# Patient Record
Sex: MALE | Race: WHITE | Employment: FULL TIME | URBAN - METROPOLITAN AREA
[De-identification: names, ages, dates, MRNs, and addresses within clinical notes are randomized per-mention and may not be internally consistent; named-entity substitution may affect disease eponyms.]

---

## 2020-07-21 ENCOUNTER — APPOINTMENT (OUTPATIENT)
Dept: CT IMAGING | Age: 50
End: 2020-07-21
Payer: COMMERCIAL

## 2020-07-21 ENCOUNTER — HOSPITAL ENCOUNTER (EMERGENCY)
Age: 50
Discharge: HOME | End: 2020-07-21
Attending: EMERGENCY MEDICINE
Payer: COMMERCIAL

## 2020-07-21 VITALS
WEIGHT: 210 LBS | HEART RATE: 74 BPM | SYSTOLIC BLOOD PRESSURE: 114 MMHG | RESPIRATION RATE: 16 BRPM | HEIGHT: 70 IN | DIASTOLIC BLOOD PRESSURE: 76 MMHG | TEMPERATURE: 98 F | BODY MASS INDEX: 30.06 KG/M2 | OXYGEN SATURATION: 100 %

## 2020-07-21 DIAGNOSIS — N20.0 KIDNEY STONE: Primary | ICD-10-CM

## 2020-07-21 LAB
ALBUMIN SERPL-MCNC: 4 G/DL (ref 3.4–5)
ALBUMIN/GLOB SERPL: 1.1 {RATIO} (ref 1–2)
ALP LIVER SERPL-CCNC: 80 U/L (ref 45–117)
ALT SERPL-CCNC: 21 U/L (ref 16–61)
ANION GAP SERPL CALC-SCNC: 4 MMOL/L (ref 0–18)
AST SERPL-CCNC: 17 U/L (ref 15–37)
BASOPHILS # BLD AUTO: 0.05 X10(3) UL (ref 0–0.2)
BASOPHILS NFR BLD AUTO: 0.4 %
BILIRUB SERPL-MCNC: 0.3 MG/DL (ref 0.1–2)
BILIRUB UR QL STRIP.AUTO: NEGATIVE
BUN BLD-MCNC: 12 MG/DL (ref 7–18)
BUN/CREAT SERPL: 11.8 (ref 10–20)
CALCIUM BLD-MCNC: 9.3 MG/DL (ref 8.5–10.1)
CHLORIDE SERPL-SCNC: 106 MMOL/L (ref 98–112)
CLARITY UR REFRACT.AUTO: CLEAR
CO2 SERPL-SCNC: 27 MMOL/L (ref 21–32)
COLOR UR AUTO: YELLOW
CREAT BLD-MCNC: 1.02 MG/DL (ref 0.7–1.3)
DEPRECATED RDW RBC AUTO: 39.7 FL (ref 35.1–46.3)
EOSINOPHIL # BLD AUTO: 0.33 X10(3) UL (ref 0–0.7)
EOSINOPHIL NFR BLD AUTO: 2.4 %
ERYTHROCYTE [DISTWIDTH] IN BLOOD BY AUTOMATED COUNT: 12.4 % (ref 11–15)
GLOBULIN PLAS-MCNC: 3.8 G/DL (ref 2.8–4.4)
GLUCOSE BLD-MCNC: 122 MG/DL (ref 70–99)
GLUCOSE UR STRIP.AUTO-MCNC: NEGATIVE MG/DL
HCT VFR BLD AUTO: 41.4 % (ref 39–53)
HGB BLD-MCNC: 13.8 G/DL (ref 13–17.5)
IMM GRANULOCYTES # BLD AUTO: 0.06 X10(3) UL (ref 0–1)
IMM GRANULOCYTES NFR BLD: 0.4 %
KETONES UR STRIP.AUTO-MCNC: NEGATIVE MG/DL
LEUKOCYTE ESTERASE UR QL STRIP.AUTO: NEGATIVE
LIPASE SERPL-CCNC: 102 U/L (ref 73–393)
LYMPHOCYTES # BLD AUTO: 4.54 X10(3) UL (ref 1–4)
LYMPHOCYTES NFR BLD AUTO: 33.3 %
M PROTEIN MFR SERPL ELPH: 7.8 G/DL (ref 6.4–8.2)
MCH RBC QN AUTO: 29.2 PG (ref 26–34)
MCHC RBC AUTO-ENTMCNC: 33.3 G/DL (ref 31–37)
MCV RBC AUTO: 87.7 FL (ref 80–100)
MONOCYTES # BLD AUTO: 1.43 X10(3) UL (ref 0.1–1)
MONOCYTES NFR BLD AUTO: 10.5 %
NEUTROPHILS # BLD AUTO: 7.23 X10 (3) UL (ref 1.5–7.7)
NEUTROPHILS # BLD AUTO: 7.23 X10(3) UL (ref 1.5–7.7)
NEUTROPHILS NFR BLD AUTO: 53 %
NITRITE UR QL STRIP.AUTO: NEGATIVE
OSMOLALITY SERPL CALC.SUM OF ELEC: 285 MOSM/KG (ref 275–295)
PH UR STRIP.AUTO: 5.5 [PH] (ref 4.5–8)
PLATELET # BLD AUTO: 377 10(3)UL (ref 150–450)
POTASSIUM SERPL-SCNC: 3.9 MMOL/L (ref 3.5–5.1)
PROT UR STRIP.AUTO-MCNC: NEGATIVE MG/DL
RBC # BLD AUTO: 4.72 X10(6)UL (ref 4.3–5.7)
RBC #/AREA URNS AUTO: >10 /HPF
SODIUM SERPL-SCNC: 137 MMOL/L (ref 136–145)
SP GR UR STRIP.AUTO: >=1.03 (ref 1–1.03)
UROBILINOGEN UR STRIP.AUTO-MCNC: 0.2 MG/DL
WBC # BLD AUTO: 13.6 X10(3) UL (ref 4–11)

## 2020-07-21 PROCEDURE — 74176 CT ABD & PELVIS W/O CONTRAST: CPT | Performed by: EMERGENCY MEDICINE

## 2020-07-21 PROCEDURE — 83690 ASSAY OF LIPASE: CPT | Performed by: EMERGENCY MEDICINE

## 2020-07-21 PROCEDURE — 99284 EMERGENCY DEPT VISIT MOD MDM: CPT

## 2020-07-21 PROCEDURE — 85025 COMPLETE CBC W/AUTO DIFF WBC: CPT | Performed by: EMERGENCY MEDICINE

## 2020-07-21 PROCEDURE — 96375 TX/PRO/DX INJ NEW DRUG ADDON: CPT

## 2020-07-21 PROCEDURE — 81001 URINALYSIS AUTO W/SCOPE: CPT | Performed by: EMERGENCY MEDICINE

## 2020-07-21 PROCEDURE — 80053 COMPREHEN METABOLIC PANEL: CPT | Performed by: EMERGENCY MEDICINE

## 2020-07-21 PROCEDURE — 83690 ASSAY OF LIPASE: CPT

## 2020-07-21 PROCEDURE — 85025 COMPLETE CBC W/AUTO DIFF WBC: CPT

## 2020-07-21 PROCEDURE — 80053 COMPREHEN METABOLIC PANEL: CPT

## 2020-07-21 PROCEDURE — 96374 THER/PROPH/DIAG INJ IV PUSH: CPT

## 2020-07-21 PROCEDURE — 96361 HYDRATE IV INFUSION ADD-ON: CPT

## 2020-07-21 RX ORDER — ONDANSETRON 4 MG/1
4 TABLET, FILM COATED ORAL EVERY 8 HOURS PRN
Qty: 10 TABLET | Refills: 0 | Status: SHIPPED | OUTPATIENT
Start: 2020-07-21 | End: 2021-07-16 | Stop reason: ALTCHOICE

## 2020-07-21 RX ORDER — HYDROMORPHONE HYDROCHLORIDE 1 MG/ML
0.5 INJECTION, SOLUTION INTRAMUSCULAR; INTRAVENOUS; SUBCUTANEOUS ONCE
Status: DISCONTINUED | OUTPATIENT
Start: 2020-07-21 | End: 2020-07-21

## 2020-07-21 RX ORDER — LISINOPRIL 10 MG/1
20 TABLET ORAL DAILY
COMMUNITY
End: 2021-12-01

## 2020-07-21 RX ORDER — HYDROCODONE BITARTRATE AND ACETAMINOPHEN 5; 325 MG/1; MG/1
1 TABLET ORAL EVERY 6 HOURS PRN
Qty: 10 TABLET | Refills: 0 | Status: SHIPPED | OUTPATIENT
Start: 2020-07-21 | End: 2020-07-31

## 2020-07-21 RX ORDER — KETOROLAC TROMETHAMINE 30 MG/ML
30 INJECTION, SOLUTION INTRAMUSCULAR; INTRAVENOUS ONCE
Status: COMPLETED | OUTPATIENT
Start: 2020-07-21 | End: 2020-07-21

## 2020-07-21 RX ORDER — HYDROMORPHONE HYDROCHLORIDE 1 MG/ML
1 INJECTION, SOLUTION INTRAMUSCULAR; INTRAVENOUS; SUBCUTANEOUS ONCE
Status: COMPLETED | OUTPATIENT
Start: 2020-07-21 | End: 2020-07-21

## 2020-07-21 RX ORDER — SODIUM CHLORIDE 9 MG/ML
INJECTION, SOLUTION INTRAVENOUS CONTINUOUS
Status: DISCONTINUED | OUTPATIENT
Start: 2020-07-21 | End: 2020-07-22

## 2020-07-21 RX ORDER — ONDANSETRON 2 MG/ML
4 INJECTION INTRAMUSCULAR; INTRAVENOUS ONCE
Status: COMPLETED | OUTPATIENT
Start: 2020-07-21 | End: 2020-07-21

## 2020-07-22 NOTE — ED PROVIDER NOTES
Patient Seen in: [facility] In [place]      History   Patient presents with:  Abdomen/Flank Pain    Stated Complaint: flank pain    HPI    This is a 41-year-old male with past medical history of hypertension who presents with acute on no rubs or gallops. ABDOMEN: Soft, nontender and nondistended. Normoactive bowel sounds. No rebound. No guarding. EXTREMITIES: Warm with brisk capillary refill.          ED Course     Labs Reviewed   COMP METABOLIC PANEL (14) - Abnormal; Notable for the which includes the Dose Index Registry. PATIENT STATED HISTORY: (As transcribed by Technologist)  Sudden onset of RLQ abdomen pain with nausea    FINDINGS:  KIDNEYS:  Normal anatomic positions.   0.3 cm proximal right ureteral calculus with mild hydronephr pass stone on his own. He should return if worse, increased pain, fever or vomiting. Otherwise follow kidney stone discharge instructions and follow-up with urology. He is comfortable this plan and discharged home in good condition.           Dispositi

## 2020-08-06 ENCOUNTER — HOSPITAL ENCOUNTER (EMERGENCY)
Age: 50
Discharge: HOME | End: 2020-08-06
Attending: EMERGENCY MEDICINE
Payer: COMMERCIAL

## 2020-08-06 ENCOUNTER — APPOINTMENT (OUTPATIENT)
Dept: CT IMAGING | Age: 50
End: 2020-08-06
Attending: EMERGENCY MEDICINE
Payer: COMMERCIAL

## 2020-08-06 VITALS
HEIGHT: 70 IN | OXYGEN SATURATION: 99 % | SYSTOLIC BLOOD PRESSURE: 129 MMHG | WEIGHT: 210 LBS | HEART RATE: 90 BPM | DIASTOLIC BLOOD PRESSURE: 76 MMHG | TEMPERATURE: 98 F | BODY MASS INDEX: 30.06 KG/M2 | RESPIRATION RATE: 20 BRPM

## 2020-08-06 DIAGNOSIS — N20.0 KIDNEY STONE: Primary | ICD-10-CM

## 2020-08-06 LAB
ALBUMIN SERPL-MCNC: 4.2 G/DL (ref 3.4–5)
ALBUMIN/GLOB SERPL: 1.1 {RATIO} (ref 1–2)
ALP LIVER SERPL-CCNC: 80 U/L (ref 45–117)
ALT SERPL-CCNC: 27 U/L (ref 16–61)
ANION GAP SERPL CALC-SCNC: 6 MMOL/L (ref 0–18)
AST SERPL-CCNC: 20 U/L (ref 15–37)
BASOPHILS # BLD AUTO: 0.03 X10(3) UL (ref 0–0.2)
BASOPHILS NFR BLD AUTO: 0.2 %
BILIRUB SERPL-MCNC: 0.7 MG/DL (ref 0.1–2)
BILIRUB UR QL STRIP.AUTO: NEGATIVE
BUN BLD-MCNC: 13 MG/DL (ref 7–18)
BUN/CREAT SERPL: 11.7 (ref 10–20)
CALCIUM BLD-MCNC: 9.1 MG/DL (ref 8.5–10.1)
CHLORIDE SERPL-SCNC: 102 MMOL/L (ref 98–112)
CLARITY UR REFRACT.AUTO: CLEAR
CO2 SERPL-SCNC: 26 MMOL/L (ref 21–32)
COLOR UR AUTO: YELLOW
CREAT BLD-MCNC: 1.11 MG/DL (ref 0.7–1.3)
DEPRECATED RDW RBC AUTO: 38.8 FL (ref 35.1–46.3)
EOSINOPHIL # BLD AUTO: 0.01 X10(3) UL (ref 0–0.7)
EOSINOPHIL NFR BLD AUTO: 0.1 %
ERYTHROCYTE [DISTWIDTH] IN BLOOD BY AUTOMATED COUNT: 12.2 % (ref 11–15)
GLOBULIN PLAS-MCNC: 3.7 G/DL (ref 2.8–4.4)
GLUCOSE BLD-MCNC: 122 MG/DL (ref 70–99)
GLUCOSE UR STRIP.AUTO-MCNC: NEGATIVE MG/DL
HCT VFR BLD AUTO: 45 % (ref 39–53)
HGB BLD-MCNC: 15.1 G/DL (ref 13–17.5)
IMM GRANULOCYTES # BLD AUTO: 0.08 X10(3) UL (ref 0–1)
IMM GRANULOCYTES NFR BLD: 0.4 %
KETONES UR STRIP.AUTO-MCNC: NEGATIVE MG/DL
LEUKOCYTE ESTERASE UR QL STRIP.AUTO: NEGATIVE
LYMPHOCYTES # BLD AUTO: 1.61 X10(3) UL (ref 1–4)
LYMPHOCYTES NFR BLD AUTO: 8.2 %
M PROTEIN MFR SERPL ELPH: 7.9 G/DL (ref 6.4–8.2)
MCH RBC QN AUTO: 29.2 PG (ref 26–34)
MCHC RBC AUTO-ENTMCNC: 33.6 G/DL (ref 31–37)
MCV RBC AUTO: 86.9 FL (ref 80–100)
MONOCYTES # BLD AUTO: 1.55 X10(3) UL (ref 0.1–1)
MONOCYTES NFR BLD AUTO: 7.9 %
NEUTROPHILS # BLD AUTO: 16.42 X10 (3) UL (ref 1.5–7.7)
NEUTROPHILS # BLD AUTO: 16.42 X10(3) UL (ref 1.5–7.7)
NEUTROPHILS NFR BLD AUTO: 83.2 %
NITRITE UR QL STRIP.AUTO: NEGATIVE
OSMOLALITY SERPL CALC.SUM OF ELEC: 279 MOSM/KG (ref 275–295)
PH UR STRIP.AUTO: 6.5 [PH] (ref 4.5–8)
PLATELET # BLD AUTO: 407 10(3)UL (ref 150–450)
POTASSIUM SERPL-SCNC: 3.6 MMOL/L (ref 3.5–5.1)
PROT UR STRIP.AUTO-MCNC: NEGATIVE MG/DL
RBC # BLD AUTO: 5.18 X10(6)UL (ref 4.3–5.7)
SODIUM SERPL-SCNC: 134 MMOL/L (ref 136–145)
SP GR UR STRIP.AUTO: <=1.005 (ref 1–1.03)
UROBILINOGEN UR STRIP.AUTO-MCNC: 0.2 MG/DL
WBC # BLD AUTO: 19.7 X10(3) UL (ref 4–11)

## 2020-08-06 PROCEDURE — 99284 EMERGENCY DEPT VISIT MOD MDM: CPT

## 2020-08-06 PROCEDURE — 81001 URINALYSIS AUTO W/SCOPE: CPT | Performed by: EMERGENCY MEDICINE

## 2020-08-06 PROCEDURE — 85025 COMPLETE CBC W/AUTO DIFF WBC: CPT | Performed by: EMERGENCY MEDICINE

## 2020-08-06 PROCEDURE — 96374 THER/PROPH/DIAG INJ IV PUSH: CPT

## 2020-08-06 PROCEDURE — 96361 HYDRATE IV INFUSION ADD-ON: CPT

## 2020-08-06 PROCEDURE — 80053 COMPREHEN METABOLIC PANEL: CPT | Performed by: EMERGENCY MEDICINE

## 2020-08-06 PROCEDURE — 74176 CT ABD & PELVIS W/O CONTRAST: CPT | Performed by: EMERGENCY MEDICINE

## 2020-08-06 RX ORDER — ONDANSETRON 4 MG/1
4 TABLET, ORALLY DISINTEGRATING ORAL EVERY 4 HOURS PRN
Qty: 10 TABLET | Refills: 0 | Status: SHIPPED | OUTPATIENT
Start: 2020-08-06 | End: 2020-08-13

## 2020-08-06 RX ORDER — HYDROCODONE BITARTRATE AND ACETAMINOPHEN 5; 325 MG/1; MG/1
1-2 TABLET ORAL EVERY 6 HOURS PRN
Qty: 10 TABLET | Refills: 0 | Status: SHIPPED | OUTPATIENT
Start: 2020-08-06 | End: 2020-08-13

## 2020-08-06 RX ORDER — KETOROLAC TROMETHAMINE 30 MG/ML
15 INJECTION, SOLUTION INTRAMUSCULAR; INTRAVENOUS ONCE
Status: COMPLETED | OUTPATIENT
Start: 2020-08-06 | End: 2020-08-06

## 2020-08-06 NOTE — ED PROVIDER NOTES
Patient Seen in: [facility] Emergency Department In [place]      History   Patient presents with:  Abdomen/Flank Pain    Stated Complaint: r flank pain    HPI    26-year-old male presents with right flank pain.   He was seen in this department approximatel Extremities: no edema, normal peripheral pulses   Neuro: Alert oriented and nonfocal   Skin: no rashes or nodules    ED Course     Labs Reviewed   URINALYSIS WITH CULTURE REFLEX - Abnormal; Notable for the following components:       Result Value    Bloo ([facility]) NRDR ([address]) which includes the Dose Index Registry. PATIENT STATED HISTORY: (As transcribed by Technologist)  Patient c/o right low back that radiates into RLQ since 1am. Hx of stones.     FINDING Oral)(cpt=74176)    Result Date: 7/21/2020  PROCEDURE:  CT ABDOMEN+PELVIS KIDNEYSTONE 2D RNDR(NO IV,NO ORAL)(CPT=74176)  COMPARISON:  None.   INDICATIONS:  flank pain  TECHNIQUE:  Unenhanced multislice CT scanning from above the kidneys to below the urinary PM     Finalized by: Kendra Hawkins MD on 7/21/2020 at 10:50 PM                MDM     CT scan again demonstrates a 0.3 mm right ureteral stone. The stone does appear to have moved more distally when compared to the previous.   Urinalysis shows no evidence of

## 2020-08-21 RX ORDER — HYDROCODONE BITARTRATE AND ACETAMINOPHEN 5; 325 MG/1; MG/1
1 TABLET ORAL EVERY 6 HOURS PRN
COMMUNITY
End: 2021-05-12

## 2020-08-24 ENCOUNTER — APPOINTMENT (OUTPATIENT)
Dept: LAB | Age: 50
End: 2020-08-24
Payer: COMMERCIAL

## 2020-08-24 DIAGNOSIS — N20.1 URETERAL STONE: ICD-10-CM

## 2020-08-24 LAB
ATRIAL RATE: 81 BPM
P AXIS: 42 DEGREES
P-R INTERVAL: 146 MS
Q-T INTERVAL: 348 MS
QRS DURATION: 86 MS
QTC CALCULATION (BEZET): 404 MS
R AXIS: 49 DEGREES
T AXIS: 15 DEGREES
VENTRICULAR RATE: 81 BPM

## 2020-08-24 PROCEDURE — 93010 ELECTROCARDIOGRAM REPORT: CPT | Performed by: INTERNAL MEDICINE

## 2020-08-24 PROCEDURE — 93005 ELECTROCARDIOGRAM TRACING: CPT

## 2020-08-25 ENCOUNTER — APPOINTMENT (OUTPATIENT)
Dept: GENERAL RADIOLOGY | Facility: HOSPITAL | Age: 50
End: 2020-08-25
Attending: UROLOGY
Payer: COMMERCIAL

## 2020-08-25 ENCOUNTER — ANESTHESIA (OUTPATIENT)
Dept: SURGERY | Facility: HOSPITAL | Age: 50
End: 2020-08-25
Payer: COMMERCIAL

## 2020-08-25 ENCOUNTER — HOSPITAL ENCOUNTER (OUTPATIENT)
Facility: HOSPITAL | Age: 50
Setting detail: HOSPITAL OUTPATIENT SURGERY
Discharge: HOME | End: 2020-08-25
Attending: UROLOGY | Admitting: UROLOGY
Payer: COMMERCIAL

## 2020-08-25 ENCOUNTER — ANESTHESIA EVENT (OUTPATIENT)
Dept: SURGERY | Facility: HOSPITAL | Age: 50
End: 2020-08-25
Payer: COMMERCIAL

## 2020-08-25 VITALS
BODY MASS INDEX: 29.86 KG/M2 | RESPIRATION RATE: 18 BRPM | OXYGEN SATURATION: 99 % | HEART RATE: 68 BPM | SYSTOLIC BLOOD PRESSURE: 126 MMHG | DIASTOLIC BLOOD PRESSURE: 91 MMHG | TEMPERATURE: 97 F | WEIGHT: 208.56 LBS | HEIGHT: 70 IN

## 2020-08-25 DIAGNOSIS — N20.1 URETERAL STONE: Primary | ICD-10-CM

## 2020-08-25 LAB — SARS-COV-2 RNA RESP QL NAA+PROBE: NOT DETECTED

## 2020-08-25 PROCEDURE — 0TC68ZZ EXTIRPATION OF MATTER FROM RIGHT URETER, VIA NATURAL OR ARTIFICIAL OPENING ENDOSCOPIC: ICD-10-PCS | Performed by: UROLOGY

## 2020-08-25 PROCEDURE — 82365 CALCULUS SPECTROSCOPY: CPT | Performed by: UROLOGY

## 2020-08-25 PROCEDURE — 0T768DZ DILATION OF RIGHT URETER WITH INTRALUMINAL DEVICE, VIA NATURAL OR ARTIFICIAL OPENING ENDOSCOPIC: ICD-10-PCS | Performed by: UROLOGY

## 2020-08-25 DEVICE — URETERAL STENT
Type: IMPLANTABLE DEVICE | Site: URETER | Status: FUNCTIONAL
Brand: ASCERTA™

## 2020-08-25 RX ORDER — DEXAMETHASONE SODIUM PHOSPHATE 4 MG/ML
VIAL (ML) INJECTION AS NEEDED
Status: DISCONTINUED | OUTPATIENT
Start: 2020-08-25 | End: 2020-08-25 | Stop reason: SURG

## 2020-08-25 RX ORDER — NALOXONE HYDROCHLORIDE 0.4 MG/ML
80 INJECTION, SOLUTION INTRAMUSCULAR; INTRAVENOUS; SUBCUTANEOUS AS NEEDED
Status: DISCONTINUED | OUTPATIENT
Start: 2020-08-25 | End: 2020-08-25

## 2020-08-25 RX ORDER — SODIUM CHLORIDE, SODIUM LACTATE, POTASSIUM CHLORIDE, CALCIUM CHLORIDE 600; 310; 30; 20 MG/100ML; MG/100ML; MG/100ML; MG/100ML
INJECTION, SOLUTION INTRAVENOUS CONTINUOUS
Status: DISCONTINUED | OUTPATIENT
Start: 2020-08-25 | End: 2020-08-25

## 2020-08-25 RX ORDER — PHENAZOPYRIDINE HYDROCHLORIDE 200 MG/1
200 TABLET, FILM COATED ORAL ONCE
Status: COMPLETED | OUTPATIENT
Start: 2020-08-25 | End: 2020-08-25

## 2020-08-25 RX ORDER — IBUPROFEN 600 MG/1
600 TABLET ORAL ONCE AS NEEDED
Status: DISCONTINUED | OUTPATIENT
Start: 2020-08-25 | End: 2020-08-25

## 2020-08-25 RX ORDER — ACETAMINOPHEN 500 MG
1000 TABLET ORAL ONCE
Status: DISCONTINUED | OUTPATIENT
Start: 2020-08-25 | End: 2020-08-25 | Stop reason: HOSPADM

## 2020-08-25 RX ORDER — ONDANSETRON 2 MG/ML
4 INJECTION INTRAMUSCULAR; INTRAVENOUS AS NEEDED
Status: DISCONTINUED | OUTPATIENT
Start: 2020-08-25 | End: 2020-08-25

## 2020-08-25 RX ORDER — ONDANSETRON 2 MG/ML
INJECTION INTRAMUSCULAR; INTRAVENOUS AS NEEDED
Status: DISCONTINUED | OUTPATIENT
Start: 2020-08-25 | End: 2020-08-25 | Stop reason: SURG

## 2020-08-25 RX ORDER — HYDROCODONE BITARTRATE AND ACETAMINOPHEN 5; 325 MG/1; MG/1
2 TABLET ORAL AS NEEDED
Status: COMPLETED | OUTPATIENT
Start: 2020-08-25 | End: 2020-08-25

## 2020-08-25 RX ORDER — HYDROMORPHONE HYDROCHLORIDE 1 MG/ML
0.4 INJECTION, SOLUTION INTRAMUSCULAR; INTRAVENOUS; SUBCUTANEOUS EVERY 5 MIN PRN
Status: DISCONTINUED | OUTPATIENT
Start: 2020-08-25 | End: 2020-08-25

## 2020-08-25 RX ORDER — METOCLOPRAMIDE HYDROCHLORIDE 5 MG/ML
10 INJECTION INTRAMUSCULAR; INTRAVENOUS AS NEEDED
Status: DISCONTINUED | OUTPATIENT
Start: 2020-08-25 | End: 2020-08-25

## 2020-08-25 RX ORDER — ACETAMINOPHEN 500 MG
1000 TABLET ORAL EVERY 6 HOURS PRN
COMMUNITY

## 2020-08-25 RX ORDER — KETOROLAC TROMETHAMINE 30 MG/ML
INJECTION, SOLUTION INTRAMUSCULAR; INTRAVENOUS AS NEEDED
Status: DISCONTINUED | OUTPATIENT
Start: 2020-08-25 | End: 2020-08-25 | Stop reason: SURG

## 2020-08-25 RX ORDER — OXYBUTYNIN CHLORIDE 5 MG/1
5 TABLET ORAL ONCE
Status: COMPLETED | OUTPATIENT
Start: 2020-08-25 | End: 2020-08-25

## 2020-08-25 RX ORDER — CEFAZOLIN SODIUM/WATER 2 G/20 ML
2 SYRINGE (ML) INTRAVENOUS ONCE
Status: COMPLETED | OUTPATIENT
Start: 2020-08-25 | End: 2020-08-25

## 2020-08-25 RX ORDER — DEXAMETHASONE SODIUM PHOSPHATE 4 MG/ML
4 VIAL (ML) INJECTION AS NEEDED
Status: DISCONTINUED | OUTPATIENT
Start: 2020-08-25 | End: 2020-08-25

## 2020-08-25 RX ORDER — HYDROCODONE BITARTRATE AND ACETAMINOPHEN 5; 325 MG/1; MG/1
1 TABLET ORAL EVERY 4 HOURS PRN
Qty: 10 TABLET | Refills: 0 | Status: SHIPPED | OUTPATIENT
Start: 2020-08-25 | End: 2020-09-04

## 2020-08-25 RX ORDER — HYDROCODONE BITARTRATE AND ACETAMINOPHEN 5; 325 MG/1; MG/1
1 TABLET ORAL AS NEEDED
Status: COMPLETED | OUTPATIENT
Start: 2020-08-25 | End: 2020-08-25

## 2020-08-25 RX ORDER — MIDAZOLAM HYDROCHLORIDE 1 MG/ML
INJECTION INTRAMUSCULAR; INTRAVENOUS AS NEEDED
Status: DISCONTINUED | OUTPATIENT
Start: 2020-08-25 | End: 2020-08-25 | Stop reason: SURG

## 2020-08-25 RX ORDER — TAMSULOSIN HYDROCHLORIDE 0.4 MG/1
0.4 CAPSULE ORAL DAILY
Qty: 7 CAPSULE | Refills: 0 | Status: SHIPPED | OUTPATIENT
Start: 2020-08-25 | End: 2020-09-01

## 2020-08-25 RX ADMIN — KETOROLAC TROMETHAMINE 30 MG: 30 INJECTION, SOLUTION INTRAMUSCULAR; INTRAVENOUS at 08:47:00

## 2020-08-25 RX ADMIN — DEXAMETHASONE SODIUM PHOSPHATE 4 MG: 4 MG/ML VIAL (ML) INJECTION at 08:38:00

## 2020-08-25 RX ADMIN — MIDAZOLAM HYDROCHLORIDE 2 MG: 1 INJECTION INTRAMUSCULAR; INTRAVENOUS at 08:36:00

## 2020-08-25 RX ADMIN — CEFAZOLIN SODIUM/WATER 2 G: 2 G/20 ML SYRINGE (ML) INTRAVENOUS at 08:39:00

## 2020-08-25 RX ADMIN — SODIUM CHLORIDE, SODIUM LACTATE, POTASSIUM CHLORIDE, CALCIUM CHLORIDE: 600; 310; 30; 20 INJECTION, SOLUTION INTRAVENOUS at 09:13:00

## 2020-08-25 RX ADMIN — ONDANSETRON 4 MG: 2 INJECTION INTRAMUSCULAR; INTRAVENOUS at 08:38:00

## 2020-08-25 NOTE — OPERATIVE REPORT
URETEROSCOPY OPERATIVE NOTE    PATIENT NAME: Katie Show OF BIRTH: 11/11/1970  DATE OF SERVICE: 8/25/2020    SURGEON:  Domenico Flower MD    ASSISTANT:  None    PREOPERATIVE DIAGNOSIS:  Right mid ureteral calculus, no evidence of stone passage The patient was brought to the operating room and placed in the supine position on the OR table. SCD's were applied and all pressure points were carefully padded. At this point, anesthesia was successfully induced.  The patient was then given the perioper This completed the procedure. They tolerated it well without complications. Please note that I was present for, and completed the entirety of this operation myself. PLAN:  He can remove the stent on the string on Friday AM, 8/28.       HUGH Lynn

## 2020-08-25 NOTE — ANESTHESIA POSTPROCEDURE EVALUATION
[address] Patient Status:  Hospital Outpatient Surgery   Age/Gender 52year old male MRN [id number]   Location [address] Attending Vivi Oppenheim, MD   Hosp Day # 0 [facility] 72.

## 2020-08-25 NOTE — ANESTHESIA PROCEDURE NOTES
Airway  Urgency: elective      General Information and Staff    Patient location during procedure: OR  Anesthesiologist: Columba Chung MD  Performed: anesthesiologist     Indications and Patient Condition  Indications for airway management: anesthesia  Sed

## 2020-08-25 NOTE — ANESTHESIA PREPROCEDURE EVALUATION
PRE-OP EVALUATION    Patient Name: Malathi Rivero    Pre-op Diagnosis: Ureteral stone [N20.1]    Procedure(s):  CYSTOSCOPY RIGHT URETEROSCOPY HOLMIUM LASER LITHOTRIPSY, RIGHT RETROGRADE PYELOGRAM RIGHT URETERAL STENT PLACEMENT     Surgeon(s) and Role: K 3.6 08/06/2020     08/06/2020    CO2 26.0 08/06/2020    BUN 13 08/06/2020    CREATSERUM 1.11 08/06/2020     (H) 08/06/2020    CA 9.1 08/06/2020            Airway      Mallampati: I       Cardiovascular    Cardiovascular exam normal.

## 2020-08-25 NOTE — H&P
Urology Pre Op H&P:   Encounter Date: 8/25/2020    Chief Complaint:   Nephrolithiasis    History of Present Illness:   Matilde Lemus is a 52year old male who presents today for evaluation of nephrolithiasis.     Patient presents for kidney stone cons Skin: Denies skin changes or rash. Cardiac: Denies chest pain, palpitations, or orthopnea. Pulmonary: Denies cough, hemoptysis, shortness of breath, or dyspnea on exertion. GI: Denies abdominal pain, nausea, vomiting, or changes in bowel movements.   [employer] 136 - 145 mmol/L 134 (L)   Potassium      3.5 - 5.1 mmol/L 3.6   Chloride      98 - 112 mmol/L 102   Carbon Dioxide, Total      21.0 - 32.0 mmol/L 26.0   ANION GAP      0 - 18 mmol/L 6   BUN      7 - 18 mg/dL 13   CREATININE      0.70 - 1.30 mg/dL 1.11 We first discussed medical expulsive therapy (MET).   This is a non-surgical option for patients with calculi <10mm who do not have indications for urgent intervention such as uncontrolled pain, acute kidney injury (BRITT), an obstructed solitary kidney, or s We then discussed ureteroscopic stone removal.  This is a surgical procedure in which a small telescope is passed through the urethra, into the bladder, and into the ureter/kidney.   When the stone is identified, it is either grasped and removed as a single I have discussed the risks, benefits and alternatives to the proposed procedure/treatment plan with the patient. Our discussion also included the risks and benefits of the alternatives treatment options, including doing nothing.  They were encouraged to as

## 2020-08-29 LAB
CALCULI MASS: 14 MG
CALCULI NUMBER: 2

## 2020-08-31 NOTE — PROGRESS NOTES
Results released to patient via JANZZ. Mr. Quang Grimes -     Your kidney stone is the most common type, calcium oxalate. See below for stone prevention recommendations. Recommendations to Help Prevent Kidney Stones     1.  Drink enough water to pro diabetes, careful control of your blood glucose (sugar) levels can be helpful in preventing certain types of stones. Unfortunately, even with these dietary changes, some patients will continue to form stones.   For patients who form multiple stones over

## 2021-02-24 ENCOUNTER — APPOINTMENT (OUTPATIENT)
Dept: GENERAL RADIOLOGY | Age: 51
End: 2021-02-24
Attending: PHYSICIAN ASSISTANT
Payer: OTHER MISCELLANEOUS

## 2021-02-24 ENCOUNTER — HOSPITAL ENCOUNTER (EMERGENCY)
Age: 51
Discharge: HOME | End: 2021-02-24
Payer: OTHER MISCELLANEOUS

## 2021-02-24 VITALS
WEIGHT: 210 LBS | BODY MASS INDEX: 30.06 KG/M2 | SYSTOLIC BLOOD PRESSURE: 131 MMHG | RESPIRATION RATE: 16 BRPM | OXYGEN SATURATION: 97 % | TEMPERATURE: 97 F | DIASTOLIC BLOOD PRESSURE: 90 MMHG | HEIGHT: 70 IN | HEART RATE: 81 BPM

## 2021-02-24 DIAGNOSIS — S46.912A STRAIN OF LEFT SHOULDER, INITIAL ENCOUNTER: Primary | ICD-10-CM

## 2021-02-24 PROCEDURE — 96372 THER/PROPH/DIAG INJ SC/IM: CPT

## 2021-02-24 PROCEDURE — 73030 X-RAY EXAM OF SHOULDER: CPT | Performed by: PHYSICIAN ASSISTANT

## 2021-02-24 PROCEDURE — 99283 EMERGENCY DEPT VISIT LOW MDM: CPT

## 2021-02-24 RX ORDER — NAPROXEN 500 MG/1
500 TABLET ORAL 2 TIMES DAILY PRN
Qty: 20 TABLET | Refills: 0 | Status: SHIPPED | OUTPATIENT
Start: 2021-02-24 | End: 2021-03-03

## 2021-02-24 RX ORDER — KETOROLAC TROMETHAMINE 30 MG/ML
30 INJECTION, SOLUTION INTRAMUSCULAR; INTRAVENOUS ONCE
Status: COMPLETED | OUTPATIENT
Start: 2021-02-24 | End: 2021-02-24

## 2021-02-24 NOTE — ED NOTES
Back from x-ray- pt became pain, diaphoretic and nauseous in x-ray-- emesis when back to room-- pt now back on cart with cool clothes on head

## 2021-02-24 NOTE — ED INITIAL ASSESSMENT (HPI)
Left shoulder injury while lifting copy paper at work, decreased range of motion, pain to shoulder  Works at [employer] [place] [address]

## 2021-02-24 NOTE — ED PROVIDER NOTES
Patient Seen in: [facility] In [place]      History   Patient presents with:  Arm or Hand Injury    Stated Complaint: left shoulder injury     HPI/Subjective:   HPI    Work comp injury  Date of injury 2/24/2021  Date of evaluation 2/2 Temp 97.2 °F (36.2 °C) (Temporal)   Resp 16   Ht 177.8 cm (5' 10\")   Wt 95.3 kg   SpO2 100%   BMI 30.13 kg/m²         Physical Exam    Gen: Well appearing, well groomed, alert and aware x 3  Neck: Supple, full range of motion, no thyromegaly or lymphadeno raising the shoulder anteriorly and laterally above 90 degrees. Suggestive of an acute impingement syndrome. We discussed this. Sent for plain films of the left shoulder.   The distal extremity is neurovascularly intact with a strong radial pulse and gri

## 2021-02-27 ENCOUNTER — OFFICE VISIT (OUTPATIENT)
Dept: OCCUPATIONAL MEDICINE | Age: 51
End: 2021-02-27
Attending: PREVENTIVE MEDICINE

## 2021-03-03 ENCOUNTER — OFFICE VISIT (OUTPATIENT)
Dept: ORTHOPEDICS CLINIC | Facility: CLINIC | Age: 51
End: 2021-03-03
Payer: OTHER MISCELLANEOUS

## 2021-03-03 DIAGNOSIS — S43.402A SPRAIN OF LEFT SHOULDER, UNSPECIFIED SHOULDER SPRAIN TYPE, INITIAL ENCOUNTER: Primary | ICD-10-CM

## 2021-03-03 DIAGNOSIS — S43.302A SUBLUXATION OF LEFT SHOULDER GIRDLE, INITIAL ENCOUNTER: ICD-10-CM

## 2021-03-03 PROCEDURE — 99243 OFF/OP CNSLTJ NEW/EST LOW 30: CPT | Performed by: ORTHOPAEDIC SURGERY

## 2021-03-03 NOTE — PROGRESS NOTES
[facility] New Patient Note    CC: Patient presents with:  Shoulder Pain: Patient is here today after injuring his left shoulder on 02/24/2021 while lifting a hand kart that weighed about 300 Lbs.  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 • Ondansetron HCl (ZOFRAN) 4 mg tablet Take 1 tablet (4 mg total) by mouth every 8 (eight) hours as needed for Nausea. 10 tablet 0     No Known Allergies  History reviewed. No pertinent family history.   Social History    Occu Likely sustained an anterior capsular strain and anterior inferior subluxation from the heavy lifting episode.   Conservative treatment was reviewed including activity protection, anti-inflammatories and probable physiotherapy once pain and inflammation res

## 2021-03-24 ENCOUNTER — OFFICE VISIT (OUTPATIENT)
Dept: ORTHOPEDICS CLINIC | Facility: CLINIC | Age: 51
End: 2021-03-24
Payer: OTHER MISCELLANEOUS

## 2021-03-24 DIAGNOSIS — S43.492D SPRAIN OF OTHER PART OF LEFT SHOULDER REGION, SUBSEQUENT ENCOUNTER: ICD-10-CM

## 2021-03-24 DIAGNOSIS — S43.302D SUBLUXATION OF LEFT SHOULDER GIRDLE, SUBSEQUENT ENCOUNTER: Primary | ICD-10-CM

## 2021-03-24 PROCEDURE — 99213 OFFICE O/P EST LOW 20 MIN: CPT | Performed by: ORTHOPAEDIC SURGERY

## 2021-03-24 RX ORDER — IBUPROFEN 200 MG
200 TABLET ORAL EVERY 6 HOURS PRN
COMMUNITY

## 2021-03-24 NOTE — PROGRESS NOTES
[facility] Follow-up Progress Note      Chief Complaint: Left shoulder pain and weakness      History: The patient is a 72-year-old male returning for assessment of his left shoulder.   He is approximately 1 month from his injury and states that Medical voice recognition software.   Although every attempt is made to correct errors where identified, discrepancies may still exist.

## 2021-03-29 ENCOUNTER — TELEPHONE (OUTPATIENT)
Dept: PHYSICAL THERAPY | Facility: HOSPITAL | Age: 51
End: 2021-03-29

## 2021-03-30 ENCOUNTER — OFFICE VISIT (OUTPATIENT)
Dept: PHYSICAL THERAPY | Age: 51
End: 2021-03-30
Attending: ORTHOPAEDIC SURGERY
Payer: OTHER MISCELLANEOUS

## 2021-03-30 PROCEDURE — 97161 PT EVAL LOW COMPLEX 20 MIN: CPT | Performed by: PHYSICAL THERAPIST

## 2021-03-30 PROCEDURE — 97110 THERAPEUTIC EXERCISES: CPT | Performed by: PHYSICAL THERAPIST

## 2021-03-30 NOTE — PROGRESS NOTES
SHOULDER EVALUATION:   Referring Physician: Dr. Pamela Durant  Diagnosis: Sprain of other part of left shoulder region, subsequent encounter (R69.622Z)     Date of Service: 3/30/2021     PATIENT SUMMARY   Matilde Lemus is a 48year old male who presents to t shoulder in the inferior direction. Current complaints appear related to capsular/joint irritation. Pt and PT discussed evaluation findings, pathology, POC and HEP. Pt voiced understanding and performs HEP correctly without reported pain.  Skilled [employer] Patient was instructed in and issued a HEP for: Alternate wall press isometric. Isometric scap retraction.   Held on base position  isometric IR/ER secondary to pain     Charges: PT Eval Low Complexity, TherEx      Total Timed Treatment: 10 min     Total

## 2021-04-01 ENCOUNTER — OFFICE VISIT (OUTPATIENT)
Dept: PHYSICAL THERAPY | Age: 51
End: 2021-04-01
Attending: ORTHOPAEDIC SURGERY
Payer: OTHER MISCELLANEOUS

## 2021-04-01 PROCEDURE — 97110 THERAPEUTIC EXERCISES: CPT | Performed by: PHYSICAL THERAPIST

## 2021-04-01 NOTE — PROGRESS NOTES
Dx: Sprain of other part of left shoulder region, subsequent encounter (X44.140U)           Authorized # of Visits:  6  Fall Risk: standard         Precautions: n/a             Subjective:   Patient reports mild left shoulder soreness  Objective:   Treatme

## 2021-04-06 ENCOUNTER — OFFICE VISIT (OUTPATIENT)
Dept: PHYSICAL THERAPY | Age: 51
End: 2021-04-06
Attending: ORTHOPAEDIC SURGERY
Payer: OTHER MISCELLANEOUS

## 2021-04-06 PROCEDURE — 97110 THERAPEUTIC EXERCISES: CPT | Performed by: PHYSICAL THERAPIST

## 2021-04-06 NOTE — PROGRESS NOTES
Dx: Sprain of other part of left shoulder region, subsequent encounter (D53.254L)           Authorized # of Visits:  6  Fall Risk: standard         Precautions: n/a             Subjective:   Patient states his arm is a little bit sore this week than last w Total Timed Treatment: 40 min  Total Treatment Time: 40 min

## 2021-04-08 ENCOUNTER — OFFICE VISIT (OUTPATIENT)
Dept: PHYSICAL THERAPY | Age: 51
End: 2021-04-08
Attending: ORTHOPAEDIC SURGERY
Payer: OTHER MISCELLANEOUS

## 2021-04-08 PROCEDURE — 97110 THERAPEUTIC EXERCISES: CPT | Performed by: PHYSICAL THERAPIST

## 2021-04-08 NOTE — PROGRESS NOTES
Dx: Sprain of other part of left shoulder region, subsequent encounter (F68.706E)           Authorized # of Visits:  6  Fall Risk: standard         Precautions: n/a             Subjective:   Patient states his arm is feeling better today.    Objective:   Ec isometric alternating left shoulder flex/exten > 20 reps each   Wall push ups 30 reps          Scap plane abduction 90 degrees 20 reps with 5 second hold (HEP)         Horizontal abduction 10 reps x 3 green band (HEP) also issued blue band for progression

## 2021-04-13 ENCOUNTER — OFFICE VISIT (OUTPATIENT)
Dept: PHYSICAL THERAPY | Age: 51
End: 2021-04-13
Attending: ORTHOPAEDIC SURGERY
Payer: OTHER MISCELLANEOUS

## 2021-04-13 PROCEDURE — 97110 THERAPEUTIC EXERCISES: CPT | Performed by: PHYSICAL THERAPIST

## 2021-04-13 NOTE — PROGRESS NOTES
Dx: Sprain of other part of left shoulder region, subsequent encounter (C01.349R)           Authorized # of Visits:  6  Fall Risk: standard         Precautions: n/a            Progress Summary  Pt has attended 5 visits in Physical Therapy.    Subjective: remains on current prescription. Will await physician recommendations. Patient was advised of these findings, precautions, and treatment options and has agreed to actively participate in planning and for this course of care.     Thank you for your refer Base IR green band (HEP) 20 reps  Standing bilat cable pulldown 60 pounds 10 reps x 3  Pulldown increased to 80 pounds 10 reps x 3  Seated row with cable column 108 pounds 10 reps x 4        Standing isometric alternating left shoulder flex/exten > 20

## 2021-04-14 ENCOUNTER — OFFICE VISIT (OUTPATIENT)
Dept: ORTHOPEDICS CLINIC | Facility: CLINIC | Age: 51
End: 2021-04-14
Payer: OTHER MISCELLANEOUS

## 2021-04-14 DIAGNOSIS — S43.302D SUBLUXATION OF LEFT SHOULDER GIRDLE, SUBSEQUENT ENCOUNTER: Primary | ICD-10-CM

## 2021-04-14 PROCEDURE — 99213 OFFICE O/P EST LOW 20 MIN: CPT | Performed by: ORTHOPAEDIC SURGERY

## 2021-04-14 NOTE — PROGRESS NOTES
[facility] Follow-up Progress Note      Chief Complaint: Shoulder pain and weakness      History: The patient is a 80-year-old male who sustained an acute injury to his left shoulder at work when lifting a 300 pound cart over a threshold on 2/2 answered and he verbalized understanding and agreement with this conservative treatment plan. If he has new symptoms or worsening an MRI the shoulder may be a reasonable next step.       Jose Whittaker MD  [facility]    The dictation was part

## 2021-04-15 ENCOUNTER — OFFICE VISIT (OUTPATIENT)
Dept: PHYSICAL THERAPY | Age: 51
End: 2021-04-15
Attending: ORTHOPAEDIC SURGERY
Payer: OTHER MISCELLANEOUS

## 2021-04-15 PROCEDURE — 97110 THERAPEUTIC EXERCISES: CPT | Performed by: PHYSICAL THERAPIST

## 2021-04-15 NOTE — PROGRESS NOTES
Dx: Sprain of other part of left shoulder region, subsequent encounter (D20.340A)           Authorized # of Visits:  6  Fall Risk: standard         Precautions: n/a             Subjective:   Patient states he has no pain at rest. The doctor wants him to wo reps x 4      Body blade at side 15 seconds x 5  Plank on hands 15 seconds x 5  8 pound, 20 pound suitcase carry  Alternating isometrics IR/ER at 45 abduction > 20 reps   each Plank on hands alternate weight shift 5 reps x 5      Scap retraction green band

## 2021-04-20 ENCOUNTER — OFFICE VISIT (OUTPATIENT)
Dept: PHYSICAL THERAPY | Age: 51
End: 2021-04-20
Attending: ORTHOPAEDIC SURGERY
Payer: OTHER MISCELLANEOUS

## 2021-04-20 PROCEDURE — 97110 THERAPEUTIC EXERCISES: CPT

## 2021-04-20 NOTE — PROGRESS NOTES
Dx: Sprain of other part of left shoulder region, subsequent encounter (N63.906I)           Authorized # of Visits:  6 + New Order  Fall Risk: standard         Precautions: n/a             Subjective:   States that he had an increase in muscle soreness fro 4     Base position ER green band 20 reps (HEP) Body blade bilat hold 90 flex 15 seconds x 4  49 pound one arm high pull 10 reps x 3  Body blade bilateral hold 90 degrees 15 seconds  Wall push ups 10 reps x 4 - unable to progress to push ups  Scapular w's

## 2021-04-23 ENCOUNTER — OFFICE VISIT (OUTPATIENT)
Dept: PHYSICAL THERAPY | Age: 51
End: 2021-04-23
Attending: ORTHOPAEDIC SURGERY
Payer: OTHER MISCELLANEOUS

## 2021-04-23 PROCEDURE — 97110 THERAPEUTIC EXERCISES: CPT

## 2021-04-23 NOTE — PROGRESS NOTES
Dx: Sprain of other part of left shoulder region, subsequent encounter (Y85.120F)           Authorized # of Visits:  6 + New Order  Fall Risk: standard         Precautions: n/a             Subjective:   No new complaints. Doing well altogether.     Aaron Huertas seconds x 5 left UE Seated row 96 pounds 10 reps x 4   Seated row 96 pounds 10 reps x 2    Base position ER green band 20 reps (HEP) Body blade bilat hold 90 flex 15 seconds x 4  49 pound one arm high pull 10 reps x 3  Body blade bilateral hold 90 degrees Plan:   Continue therapy 2-3x/wk  X 4 weeks progressive strengthening     Charges:  TherEx 3        Total Timed Treatment: 40 min  Total Treatment Time: 40 min

## 2021-04-27 ENCOUNTER — OFFICE VISIT (OUTPATIENT)
Dept: PHYSICAL THERAPY | Age: 51
End: 2021-04-27
Attending: ORTHOPAEDIC SURGERY
Payer: OTHER MISCELLANEOUS

## 2021-04-27 PROCEDURE — 97110 THERAPEUTIC EXERCISES: CPT | Performed by: PHYSICAL THERAPIST

## 2021-04-27 NOTE — PROGRESS NOTES
Dx: Sprain of other part of left shoulder region, subsequent encounter (C14.333Y)           Authorized # of Visits:  6 + New Order  Fall Risk: standard         Precautions: n/a             Subjective:   Patient states that his shoulder continues to improve reps x 4  96 pounds bilat pulldown cable column 10 reps x 4  Prone shoulder extension 40 reps    Body blade at side 15 seconds x 5  Plank on hands 15 seconds x 5  8 pound, 20 pound suitcase carry  Alternating isometrics IR/ER at 45 abduction > 20 reps   ea Horizontal abduction 10 reps x 3 green band (HEP) also issued blue band for progression as patient reported ease with green band   Horizontal abduction blue band 10 reps x 3  Horizontal abduction blue band 10 reps x 3:    TRX Rows 3 x 10 Horizontal abd

## 2021-04-29 ENCOUNTER — OFFICE VISIT (OUTPATIENT)
Dept: PHYSICAL THERAPY | Age: 51
End: 2021-04-29
Attending: ORTHOPAEDIC SURGERY
Payer: OTHER MISCELLANEOUS

## 2021-04-29 PROCEDURE — 97110 THERAPEUTIC EXERCISES: CPT | Performed by: PHYSICAL THERAPIST

## 2021-04-29 NOTE — PROGRESS NOTES
Dx: Sprain of other part of left shoulder region, subsequent encounter (A66.637X)           Authorized # of Visits:  6 + New Order  Fall Risk: standard         Precautions: n/a             Subjective:   Patient states no pain during the day.     Objective: pounds bilat pulldown cable column 10 reps x 4  Prone shoulder extension 40 reps  Ventura's carry 25 pounds right, 30 pounds left >  70 feet x 4    Body blade at side 15 seconds x 5  Plank on hands 15 seconds x 5  8 pound, 20 pound suitcase carry  Alternati Horizontal abduction blue band 10 reps x 3      Scap plane abduction 90 degrees 20 reps with 5 second hold (HEP) 9 pound bar bicep curl with bilateral hold < 20 reps - shoulder pain Chop 72 pounds with cable 20 reps each  Chop 72 pounds with cable 30 re

## 2021-05-03 ENCOUNTER — APPOINTMENT (OUTPATIENT)
Dept: PHYSICAL THERAPY | Age: 51
End: 2021-05-03
Payer: OTHER MISCELLANEOUS

## 2021-05-05 ENCOUNTER — OFFICE VISIT (OUTPATIENT)
Dept: PHYSICAL THERAPY | Age: 51
End: 2021-05-05
Attending: ORTHOPAEDIC SURGERY
Payer: OTHER MISCELLANEOUS

## 2021-05-05 PROCEDURE — 97110 THERAPEUTIC EXERCISES: CPT | Performed by: PHYSICAL THERAPIST

## 2021-05-05 NOTE — PROGRESS NOTES
Dx: Sprain of other part of left shoulder region, subsequent encounter (F18.196A)           Authorized # of Visits:  6 + New Order  Fall Risk: standard         Precautions: n/a             Subjective:   Patient states he is doing better but he still has pa 3  Plank alternate arm lift 30 seconds x 4  Bicep curl bilateral hold 18 pound bar 10 reps x 3    Wall push ups 10 reps x 4 - unable to progress to push ups  Scapular w's green x 20 Scapular w's green x 20 Plank on hands alternate lift 15 seconds x 5 reps

## 2021-05-10 ENCOUNTER — APPOINTMENT (OUTPATIENT)
Dept: PHYSICAL THERAPY | Age: 51
End: 2021-05-10
Attending: ORTHOPAEDIC SURGERY
Payer: OTHER MISCELLANEOUS

## 2021-05-10 ENCOUNTER — TELEPHONE (OUTPATIENT)
Dept: PHYSICAL THERAPY | Age: 51
End: 2021-05-10

## 2021-05-11 ENCOUNTER — TELEPHONE (OUTPATIENT)
Dept: PHYSICAL THERAPY | Facility: HOSPITAL | Age: 51
End: 2021-05-11

## 2021-05-11 ENCOUNTER — APPOINTMENT (OUTPATIENT)
Dept: PHYSICAL THERAPY | Age: 51
End: 2021-05-11
Attending: ORTHOPAEDIC SURGERY
Payer: OTHER MISCELLANEOUS

## 2021-05-12 ENCOUNTER — OFFICE VISIT (OUTPATIENT)
Dept: ORTHOPEDICS CLINIC | Facility: CLINIC | Age: 51
End: 2021-05-12
Payer: OTHER MISCELLANEOUS

## 2021-05-12 ENCOUNTER — TELEPHONE (OUTPATIENT)
Dept: PHYSICAL THERAPY | Facility: HOSPITAL | Age: 51
End: 2021-05-12

## 2021-05-12 DIAGNOSIS — S43.492D SPRAIN OF OTHER PART OF LEFT SHOULDER REGION, SUBSEQUENT ENCOUNTER: Primary | ICD-10-CM

## 2021-05-12 DIAGNOSIS — M75.42 IMPINGEMENT SYNDROME OF LEFT SHOULDER: ICD-10-CM

## 2021-05-12 DIAGNOSIS — S43.302D SUBLUXATION OF LEFT SHOULDER GIRDLE, SUBSEQUENT ENCOUNTER: ICD-10-CM

## 2021-05-12 PROCEDURE — 99213 OFFICE O/P EST LOW 20 MIN: CPT | Performed by: ORTHOPAEDIC SURGERY

## 2021-05-12 PROCEDURE — 20610 DRAIN/INJ JOINT/BURSA W/O US: CPT | Performed by: ORTHOPAEDIC SURGERY

## 2021-05-12 RX ORDER — TRIAMCINOLONE ACETONIDE 40 MG/ML
40 INJECTION, SUSPENSION INTRA-ARTICULAR; INTRAMUSCULAR ONCE
Status: COMPLETED | OUTPATIENT
Start: 2021-05-12 | End: 2021-05-12

## 2021-05-12 RX ADMIN — TRIAMCINOLONE ACETONIDE 40 MG: 40 INJECTION, SUSPENSION INTRA-ARTICULAR; INTRAMUSCULAR at 10:56:00

## 2021-05-12 NOTE — PROGRESS NOTES
[facility] Follow-up Progress Note      Chief Complaint: Left shoulder pain and weakness    History: The patient is a 63-year-old male returning for assessment of his left shoulder.   He sustained an injury at work about 3 months ago and is cont he has any new symptoms or concerns. He may continue to work light duty under the previous restrictions of 20 pounds push pull lift and carry floor to chest, 2 pounds overhead.     Shoulder Subacromial Injection Procedure:  After discussing risks, benefits

## 2021-05-13 ENCOUNTER — APPOINTMENT (OUTPATIENT)
Dept: PHYSICAL THERAPY | Age: 51
End: 2021-05-13
Attending: ORTHOPAEDIC SURGERY
Payer: OTHER MISCELLANEOUS

## 2021-05-17 ENCOUNTER — APPOINTMENT (OUTPATIENT)
Dept: PHYSICAL THERAPY | Age: 51
End: 2021-05-17
Payer: OTHER MISCELLANEOUS

## 2021-05-18 ENCOUNTER — APPOINTMENT (OUTPATIENT)
Dept: PHYSICAL THERAPY | Age: 51
End: 2021-05-18
Attending: ORTHOPAEDIC SURGERY
Payer: OTHER MISCELLANEOUS

## 2021-05-19 ENCOUNTER — APPOINTMENT (OUTPATIENT)
Dept: PHYSICAL THERAPY | Age: 51
End: 2021-05-19
Payer: OTHER MISCELLANEOUS

## 2021-05-20 ENCOUNTER — APPOINTMENT (OUTPATIENT)
Dept: PHYSICAL THERAPY | Age: 51
End: 2021-05-20
Attending: ORTHOPAEDIC SURGERY
Payer: OTHER MISCELLANEOUS

## 2021-05-24 ENCOUNTER — APPOINTMENT (OUTPATIENT)
Dept: PHYSICAL THERAPY | Age: 51
End: 2021-05-24
Attending: ORTHOPAEDIC SURGERY
Payer: OTHER MISCELLANEOUS

## 2021-05-26 ENCOUNTER — APPOINTMENT (OUTPATIENT)
Dept: PHYSICAL THERAPY | Age: 51
End: 2021-05-26
Payer: OTHER MISCELLANEOUS

## 2021-06-02 ENCOUNTER — OFFICE VISIT (OUTPATIENT)
Dept: ORTHOPEDICS CLINIC | Facility: CLINIC | Age: 51
End: 2021-06-02
Payer: OTHER MISCELLANEOUS

## 2021-06-02 VITALS — HEIGHT: 70 IN | WEIGHT: 215 LBS | BODY MASS INDEX: 30.78 KG/M2

## 2021-06-02 DIAGNOSIS — S43.002D: Primary | ICD-10-CM

## 2021-06-02 DIAGNOSIS — M75.42 IMPINGEMENT SYNDROME OF LEFT SHOULDER: ICD-10-CM

## 2021-06-02 PROCEDURE — 3008F BODY MASS INDEX DOCD: CPT | Performed by: ORTHOPAEDIC SURGERY

## 2021-06-02 PROCEDURE — 99213 OFFICE O/P EST LOW 20 MIN: CPT | Performed by: ORTHOPAEDIC SURGERY

## 2021-06-02 NOTE — PROGRESS NOTES
[facility] Follow-up Progress Note      Chief Complaint: Left shoulder pain and weakness      History: The patient is a 17-year-old male presenting for reassessment of his left shoulder.   He underwent cortisone injection approximately 3 weeks a prepared using [employer] [facility] [employer] voice recognition software.   Although every attempt is made to correct errors where identified, discrepancies may still exist.

## 2021-06-03 ENCOUNTER — APPOINTMENT (OUTPATIENT)
Dept: PHYSICAL THERAPY | Age: 51
End: 2021-06-03
Attending: FAMILY MEDICINE
Payer: COMMERCIAL

## 2021-06-03 ENCOUNTER — TELEPHONE (OUTPATIENT)
Dept: PHYSICAL THERAPY | Age: 51
End: 2021-06-03

## 2021-06-04 ENCOUNTER — TELEPHONE (OUTPATIENT)
Dept: PHYSICAL THERAPY | Facility: HOSPITAL | Age: 51
End: 2021-06-04

## 2021-06-08 ENCOUNTER — APPOINTMENT (OUTPATIENT)
Dept: PHYSICAL THERAPY | Age: 51
End: 2021-06-08
Attending: FAMILY MEDICINE
Payer: COMMERCIAL

## 2021-06-10 ENCOUNTER — APPOINTMENT (OUTPATIENT)
Dept: PHYSICAL THERAPY | Age: 51
End: 2021-06-10
Attending: ORTHOPAEDIC SURGERY
Payer: COMMERCIAL

## 2021-06-15 ENCOUNTER — APPOINTMENT (OUTPATIENT)
Dept: PHYSICAL THERAPY | Age: 51
End: 2021-06-15
Attending: FAMILY MEDICINE
Payer: COMMERCIAL

## 2021-06-17 ENCOUNTER — APPOINTMENT (OUTPATIENT)
Dept: PHYSICAL THERAPY | Age: 51
End: 2021-06-17
Attending: ORTHOPAEDIC SURGERY
Payer: COMMERCIAL

## 2021-06-22 ENCOUNTER — APPOINTMENT (OUTPATIENT)
Dept: PHYSICAL THERAPY | Age: 51
End: 2021-06-22
Attending: ORTHOPAEDIC SURGERY
Payer: COMMERCIAL

## 2021-06-22 ENCOUNTER — HOSPITAL ENCOUNTER (OUTPATIENT)
Dept: MRI IMAGING | Age: 51
Discharge: HOME | End: 2021-06-22
Attending: ORTHOPAEDIC SURGERY
Payer: OTHER MISCELLANEOUS

## 2021-06-22 DIAGNOSIS — S43.002D: ICD-10-CM

## 2021-06-22 DIAGNOSIS — M75.42 IMPINGEMENT SYNDROME OF LEFT SHOULDER: ICD-10-CM

## 2021-06-22 PROCEDURE — 73221 MRI JOINT UPR EXTREM W/O DYE: CPT | Performed by: ORTHOPAEDIC SURGERY

## 2021-06-23 ENCOUNTER — OFFICE VISIT (OUTPATIENT)
Dept: ORTHOPEDICS CLINIC | Facility: CLINIC | Age: 51
End: 2021-06-23
Payer: OTHER MISCELLANEOUS

## 2021-06-23 ENCOUNTER — TELEPHONE (OUTPATIENT)
Dept: ORTHOPEDICS CLINIC | Facility: CLINIC | Age: 51
End: 2021-06-23

## 2021-06-23 DIAGNOSIS — M75.42 IMPINGEMENT SYNDROME OF LEFT SHOULDER: ICD-10-CM

## 2021-06-23 DIAGNOSIS — S46.212A TRAUMATIC PARTIAL TEAR OF LEFT BICEPS TENDON, INITIAL ENCOUNTER: ICD-10-CM

## 2021-06-23 DIAGNOSIS — S46.012D TRAUMATIC INCOMPLETE TEAR OF LEFT ROTATOR CUFF, SUBSEQUENT ENCOUNTER: Primary | ICD-10-CM

## 2021-06-23 PROCEDURE — 99214 OFFICE O/P EST MOD 30 MIN: CPT | Performed by: ORTHOPAEDIC SURGERY

## 2021-06-23 NOTE — TELEPHONE ENCOUNTER
Surgeon: Dr. Jose Whittaker    Date of Surgery: 7/27/2021   Time: 7:00AM    Post Op: 8/4/2021     Facility[facility]     Is this work comp related?  yes    Surgical Assistant Obtained: Reece Victor PA-C     Preadmission Testing Ordered: yes     Pr

## 2021-06-23 NOTE — PROGRESS NOTES
[facility] Follow-up Progress Note      Chief Complaint: Left shoulder pain and weakness      History: The patient is a 28-year-old male presenting for follow-up of his left shoulder.   He sustained an injury during a heavy lifting episode on 2/ Mr. Pete Soliz. The patient has abnormalities at the proximal biceps from a traction type injury. It is medially subluxed. There are signs of partial rotator cuff damage as well with no full-thickness involvement.   In light of the duration of his sympto using [employer] voice recognition software.   Although every attempt is made to correct errors where identified, discrepancies may still exist.

## 2021-06-24 ENCOUNTER — APPOINTMENT (OUTPATIENT)
Dept: PHYSICAL THERAPY | Age: 51
End: 2021-06-24
Attending: ORTHOPAEDIC SURGERY
Payer: COMMERCIAL

## 2021-07-17 ENCOUNTER — LABORATORY ENCOUNTER (OUTPATIENT)
Dept: LAB | Age: 51
End: 2021-07-17
Attending: ORTHOPAEDIC SURGERY
Payer: OTHER MISCELLANEOUS

## 2021-07-17 ENCOUNTER — EKG ENCOUNTER (OUTPATIENT)
Dept: LAB | Age: 51
End: 2021-07-17
Attending: ORTHOPAEDIC SURGERY
Payer: OTHER MISCELLANEOUS

## 2021-07-17 DIAGNOSIS — M75.42 IMPINGEMENT SYNDROME OF LEFT SHOULDER: ICD-10-CM

## 2021-07-17 DIAGNOSIS — S46.012D TRAUMATIC INCOMPLETE TEAR OF LEFT ROTATOR CUFF, SUBSEQUENT ENCOUNTER: ICD-10-CM

## 2021-07-17 LAB
ANION GAP SERPL CALC-SCNC: 4 MMOL/L (ref 0–18)
BUN BLD-MCNC: 12 MG/DL (ref 7–18)
BUN/CREAT SERPL: 14.5 (ref 10–20)
CALCIUM BLD-MCNC: 8.9 MG/DL (ref 8.5–10.1)
CHLORIDE SERPL-SCNC: 109 MMOL/L (ref 98–112)
CO2 SERPL-SCNC: 28 MMOL/L (ref 21–32)
CREAT BLD-MCNC: 0.83 MG/DL
GLUCOSE BLD-MCNC: 91 MG/DL (ref 70–99)
OSMOLALITY SERPL CALC.SUM OF ELEC: 291 MOSM/KG (ref 275–295)
PATIENT FASTING Y/N/NP: YES
POTASSIUM SERPL-SCNC: 4.6 MMOL/L (ref 3.5–5.1)
SODIUM SERPL-SCNC: 141 MMOL/L (ref 136–145)

## 2021-07-17 PROCEDURE — 36415 COLL VENOUS BLD VENIPUNCTURE: CPT

## 2021-07-17 PROCEDURE — 80048 BASIC METABOLIC PNL TOTAL CA: CPT

## 2021-07-17 PROCEDURE — 93005 ELECTROCARDIOGRAM TRACING: CPT

## 2021-07-17 PROCEDURE — 93010 ELECTROCARDIOGRAM REPORT: CPT | Performed by: INTERNAL MEDICINE

## 2021-07-20 LAB
ATRIAL RATE: 68 BPM
P AXIS: 55 DEGREES
P-R INTERVAL: 146 MS
Q-T INTERVAL: 376 MS
QRS DURATION: 78 MS
QTC CALCULATION (BEZET): 399 MS
R AXIS: 54 DEGREES
T AXIS: 29 DEGREES
VENTRICULAR RATE: 68 BPM

## 2021-07-24 ENCOUNTER — LAB ENCOUNTER (OUTPATIENT)
Dept: LAB | Age: 51
End: 2021-07-24
Attending: ORTHOPAEDIC SURGERY
Payer: OTHER MISCELLANEOUS

## 2021-07-24 DIAGNOSIS — S46.012D TRAUMATIC INCOMPLETE TEAR OF LEFT ROTATOR CUFF, SUBSEQUENT ENCOUNTER: ICD-10-CM

## 2021-07-24 DIAGNOSIS — M75.42 IMPINGEMENT SYNDROME OF LEFT SHOULDER: ICD-10-CM

## 2021-07-25 LAB — SARS-COV-2 RNA RESP QL NAA+PROBE: NOT DETECTED

## 2021-07-27 ENCOUNTER — ANESTHESIA EVENT (OUTPATIENT)
Dept: SURGERY | Facility: HOSPITAL | Age: 51
End: 2021-07-27
Payer: OTHER MISCELLANEOUS

## 2021-07-27 ENCOUNTER — HOSPITAL ENCOUNTER (OUTPATIENT)
Facility: HOSPITAL | Age: 51
Setting detail: HOSPITAL OUTPATIENT SURGERY
Discharge: HOME | End: 2021-07-27
Attending: ORTHOPAEDIC SURGERY | Admitting: ORTHOPAEDIC SURGERY
Payer: OTHER MISCELLANEOUS

## 2021-07-27 ENCOUNTER — ANESTHESIA (OUTPATIENT)
Dept: SURGERY | Facility: HOSPITAL | Age: 51
End: 2021-07-27
Payer: OTHER MISCELLANEOUS

## 2021-07-27 VITALS
SYSTOLIC BLOOD PRESSURE: 142 MMHG | DIASTOLIC BLOOD PRESSURE: 84 MMHG | HEIGHT: 70 IN | RESPIRATION RATE: 20 BRPM | BODY MASS INDEX: 29.32 KG/M2 | TEMPERATURE: 98 F | HEART RATE: 75 BPM | OXYGEN SATURATION: 97 % | WEIGHT: 204.81 LBS

## 2021-07-27 DIAGNOSIS — M75.42 IMPINGEMENT SYNDROME OF LEFT SHOULDER: ICD-10-CM

## 2021-07-27 DIAGNOSIS — S46.212A TRAUMATIC PARTIAL TEAR OF LEFT BICEPS TENDON, INITIAL ENCOUNTER: ICD-10-CM

## 2021-07-27 DIAGNOSIS — S46.012D TRAUMATIC INCOMPLETE TEAR OF LEFT ROTATOR CUFF, SUBSEQUENT ENCOUNTER: Primary | ICD-10-CM

## 2021-07-27 PROCEDURE — 0RHK04Z INSERTION OF INTERNAL FIXATION DEVICE INTO LEFT SHOULDER JOINT, OPEN APPROACH: ICD-10-PCS | Performed by: ORTHOPAEDIC SURGERY

## 2021-07-27 PROCEDURE — 3E0T3BZ INTRODUCTION OF ANESTHETIC AGENT INTO PERIPHERAL NERVES AND PLEXI, PERCUTANEOUS APPROACH: ICD-10-PCS | Performed by: ANESTHESIOLOGY

## 2021-07-27 PROCEDURE — 0RNK4ZZ RELEASE LEFT SHOULDER JOINT, PERCUTANEOUS ENDOSCOPIC APPROACH: ICD-10-PCS | Performed by: ORTHOPAEDIC SURGERY

## 2021-07-27 PROCEDURE — 76942 ECHO GUIDE FOR BIOPSY: CPT | Performed by: ANESTHESIOLOGY

## 2021-07-27 PROCEDURE — 0LS40ZZ REPOSITION LEFT UPPER ARM TENDON, OPEN APPROACH: ICD-10-PCS | Performed by: ORTHOPAEDIC SURGERY

## 2021-07-27 DEVICE — SUTR ANCH SWVLK TENOD BIOCOM 6.25X19.1MM
Type: IMPLANTABLE DEVICE | Site: SHOULDER | Status: FUNCTIONAL
Brand: ARTHREX®

## 2021-07-27 RX ORDER — POVIDONE-IODINE 10 MG/G
OINTMENT TOPICAL AS NEEDED
Status: DISCONTINUED | OUTPATIENT
Start: 2021-07-27 | End: 2021-07-27 | Stop reason: HOSPADM

## 2021-07-27 RX ORDER — ONDANSETRON 2 MG/ML
4 INJECTION INTRAMUSCULAR; INTRAVENOUS AS NEEDED
Status: DISCONTINUED | OUTPATIENT
Start: 2021-07-27 | End: 2021-07-27

## 2021-07-27 RX ORDER — NALOXONE HYDROCHLORIDE 0.4 MG/ML
80 INJECTION, SOLUTION INTRAMUSCULAR; INTRAVENOUS; SUBCUTANEOUS AS NEEDED
Status: DISCONTINUED | OUTPATIENT
Start: 2021-07-27 | End: 2021-07-27

## 2021-07-27 RX ORDER — HYDROCODONE BITARTRATE AND ACETAMINOPHEN 5; 325 MG/1; MG/1
1 TABLET ORAL EVERY 4 HOURS PRN
Qty: 30 TABLET | Refills: 0 | Status: SHIPPED | OUTPATIENT
Start: 2021-07-27 | End: 2021-12-01

## 2021-07-27 RX ORDER — DEXAMETHASONE SODIUM PHOSPHATE 4 MG/ML
VIAL (ML) INJECTION AS NEEDED
Status: DISCONTINUED | OUTPATIENT
Start: 2021-07-27 | End: 2021-07-27 | Stop reason: SURG

## 2021-07-27 RX ORDER — CEFAZOLIN SODIUM/WATER 2 G/20 ML
2 SYRINGE (ML) INTRAVENOUS ONCE
Status: COMPLETED | OUTPATIENT
Start: 2021-07-27 | End: 2021-07-27

## 2021-07-27 RX ORDER — ONDANSETRON 2 MG/ML
INJECTION INTRAMUSCULAR; INTRAVENOUS AS NEEDED
Status: DISCONTINUED | OUTPATIENT
Start: 2021-07-27 | End: 2021-07-27 | Stop reason: SURG

## 2021-07-27 RX ORDER — SODIUM CHLORIDE, SODIUM LACTATE, POTASSIUM CHLORIDE, CALCIUM CHLORIDE 600; 310; 30; 20 MG/100ML; MG/100ML; MG/100ML; MG/100ML
INJECTION, SOLUTION INTRAVENOUS CONTINUOUS
Status: DISCONTINUED | OUTPATIENT
Start: 2021-07-27 | End: 2021-07-27

## 2021-07-27 RX ORDER — ROCURONIUM BROMIDE 10 MG/ML
INJECTION, SOLUTION INTRAVENOUS AS NEEDED
Status: DISCONTINUED | OUTPATIENT
Start: 2021-07-27 | End: 2021-07-27 | Stop reason: SURG

## 2021-07-27 RX ORDER — HYDROCODONE BITARTRATE AND ACETAMINOPHEN 5; 325 MG/1; MG/1
1 TABLET ORAL AS NEEDED
Status: DISCONTINUED | OUTPATIENT
Start: 2021-07-27 | End: 2021-07-27

## 2021-07-27 RX ORDER — KETOROLAC TROMETHAMINE 30 MG/ML
30 INJECTION, SOLUTION INTRAMUSCULAR; INTRAVENOUS ONCE
Status: COMPLETED | OUTPATIENT
Start: 2021-07-27 | End: 2021-07-27

## 2021-07-27 RX ORDER — MEPERIDINE HYDROCHLORIDE 25 MG/ML
12.5 INJECTION INTRAMUSCULAR; INTRAVENOUS; SUBCUTANEOUS AS NEEDED
Status: DISCONTINUED | OUTPATIENT
Start: 2021-07-27 | End: 2021-07-27

## 2021-07-27 RX ORDER — MIDAZOLAM HYDROCHLORIDE 1 MG/ML
1 INJECTION INTRAMUSCULAR; INTRAVENOUS EVERY 5 MIN PRN
Status: DISCONTINUED | OUTPATIENT
Start: 2021-07-27 | End: 2021-07-27

## 2021-07-27 RX ORDER — METOCLOPRAMIDE HYDROCHLORIDE 5 MG/ML
INJECTION INTRAMUSCULAR; INTRAVENOUS AS NEEDED
Status: DISCONTINUED | OUTPATIENT
Start: 2021-07-27 | End: 2021-07-27 | Stop reason: SURG

## 2021-07-27 RX ORDER — MIDAZOLAM HYDROCHLORIDE 1 MG/ML
INJECTION INTRAMUSCULAR; INTRAVENOUS AS NEEDED
Status: DISCONTINUED | OUTPATIENT
Start: 2021-07-27 | End: 2021-07-27 | Stop reason: SURG

## 2021-07-27 RX ORDER — ACETAMINOPHEN 500 MG
1000 TABLET ORAL ONCE
Status: DISCONTINUED | OUTPATIENT
Start: 2021-07-27 | End: 2021-07-27

## 2021-07-27 RX ORDER — KETOROLAC TROMETHAMINE 30 MG/ML
INJECTION, SOLUTION INTRAMUSCULAR; INTRAVENOUS
Status: COMPLETED
Start: 2021-07-27 | End: 2021-07-27

## 2021-07-27 RX ORDER — GLYCOPYRROLATE 0.2 MG/ML
INJECTION, SOLUTION INTRAMUSCULAR; INTRAVENOUS AS NEEDED
Status: DISCONTINUED | OUTPATIENT
Start: 2021-07-27 | End: 2021-07-27 | Stop reason: SURG

## 2021-07-27 RX ORDER — HYDROMORPHONE HYDROCHLORIDE 1 MG/ML
INJECTION, SOLUTION INTRAMUSCULAR; INTRAVENOUS; SUBCUTANEOUS
Status: COMPLETED
Start: 2021-07-27 | End: 2021-07-27

## 2021-07-27 RX ORDER — NEOSTIGMINE METHYLSULFATE 1 MG/ML
INJECTION INTRAVENOUS AS NEEDED
Status: DISCONTINUED | OUTPATIENT
Start: 2021-07-27 | End: 2021-07-27 | Stop reason: SURG

## 2021-07-27 RX ORDER — HYDROMORPHONE HYDROCHLORIDE 1 MG/ML
0.4 INJECTION, SOLUTION INTRAMUSCULAR; INTRAVENOUS; SUBCUTANEOUS EVERY 5 MIN PRN
Status: DISCONTINUED | OUTPATIENT
Start: 2021-07-27 | End: 2021-07-27

## 2021-07-27 RX ORDER — DIPHENHYDRAMINE HYDROCHLORIDE 50 MG/ML
12.5 INJECTION INTRAMUSCULAR; INTRAVENOUS AS NEEDED
Status: DISCONTINUED | OUTPATIENT
Start: 2021-07-27 | End: 2021-07-27

## 2021-07-27 RX ORDER — HYDROCODONE BITARTRATE AND ACETAMINOPHEN 5; 325 MG/1; MG/1
2 TABLET ORAL AS NEEDED
Status: DISCONTINUED | OUTPATIENT
Start: 2021-07-27 | End: 2021-07-27

## 2021-07-27 RX ADMIN — GLYCOPYRROLATE 0.4 MG: 0.2 INJECTION, SOLUTION INTRAMUSCULAR; INTRAVENOUS at 15:23:00

## 2021-07-27 RX ADMIN — MIDAZOLAM HYDROCHLORIDE 2 MG: 1 INJECTION INTRAMUSCULAR; INTRAVENOUS at 12:34:00

## 2021-07-27 RX ADMIN — ROCURONIUM BROMIDE 10 MG: 10 INJECTION, SOLUTION INTRAVENOUS at 14:06:00

## 2021-07-27 RX ADMIN — NEOSTIGMINE METHYLSULFATE 3 MG: 1 INJECTION INTRAVENOUS at 15:23:00

## 2021-07-27 RX ADMIN — DEXAMETHASONE SODIUM PHOSPHATE 8 MG: 4 MG/ML VIAL (ML) INJECTION at 13:33:00

## 2021-07-27 RX ADMIN — SODIUM CHLORIDE, SODIUM LACTATE, POTASSIUM CHLORIDE, CALCIUM CHLORIDE: 600; 310; 30; 20 INJECTION, SOLUTION INTRAVENOUS at 15:39:00

## 2021-07-27 RX ADMIN — CEFAZOLIN SODIUM/WATER 2 G: 2 G/20 ML SYRINGE (ML) INTRAVENOUS at 12:53:00

## 2021-07-27 RX ADMIN — ONDANSETRON 4 MG: 2 INJECTION INTRAMUSCULAR; INTRAVENOUS at 14:55:00

## 2021-07-27 RX ADMIN — METOCLOPRAMIDE HYDROCHLORIDE 10 MG: 5 INJECTION INTRAMUSCULAR; INTRAVENOUS at 14:07:00

## 2021-07-27 RX ADMIN — ROCURONIUM BROMIDE 50 MG: 10 INJECTION, SOLUTION INTRAVENOUS at 12:52:00

## 2021-07-27 NOTE — BRIEF OP NOTE
Pre-Operative Diagnosis: Traumatic incomplete tear of left rotator cuff, subsequent encounter [S46.012D]  Impingement syndrome of left shoulder [M75.42]  Traumatic partial tear of left biceps tendon, initial encounter [F02.047X]     Post-Operative Diagnosi

## 2021-07-27 NOTE — ANESTHESIA PREPROCEDURE EVALUATION
PRE-OP EVALUATION    Patient Name: Timmy Logan    Admit Diagnosis: Traumatic incomplete tear of left rotator cuff, subsequent encounter [S46.012D]  Impingement syndrome of left shoulder [M75.42]  Traumatic partial tear of left biceps tendon, initia Cardiovascular                  (+) hypertension and well controlled                                    Endo/Other    Negative endo/other ROS.                               Pulmonary    Negative pulmonary ROS.                (+) sleep apn

## 2021-07-27 NOTE — H&P
Orthopaedic H&P Update    H&P performed by Dr Inez Dixon on 7/13/21 was reviewed by Jose Whittaker MD on 7/27/2021, the patient was examined and no significant changes have occurred in the patient's condition since the H&P was performed.   Risks and benefits

## 2021-07-27 NOTE — ANESTHESIA PROCEDURE NOTES
Airway  Date/Time: 7/27/2021 12:52 PM  Urgency: elective      General Information and Staff    Patient location during procedure: OR  Anesthesiologist: Ladi Springer MD  Performed: anesthesiologist     Indications and Patient Condition  Indications for air

## 2021-07-27 NOTE — ANESTHESIA PROCEDURE NOTES
[facility]  Performed by: Latesha Mack MD  Authorized by: Latesha Mack MD       General Information and Staff    Start Time:  7/27/2021 12:39 PM  End Time:  7/27/2021 12:46 PM  Anesthesiologist:  Latesha Mack MD  Performed by:   Anesthesiologist  Georgi Palmer

## 2021-07-28 NOTE — OPERATIVE REPORT
[address]    PATIENT'S NAME: Adi Blas   ATTENDING PHYSICIAN: Chi Frost M.D. OPERATING PHYSICIAN: Chi Frost M.D.    PATIENT ACCOUNT#:   [de-identified]    LOCATION:  [address] [place] [address]  MEDICAL RECORD #:   [id number]       WILSON The patient responded temporarily to subacromial injection, but ultimately failed nonoperative care.   The recommendation was therefore for arthroscopy of this left shoulder with subacromial decompression, debridement of the partial-thickness rotator cuff t posterior soft spot. Semi-blunt entry into the glenohumeral joint revealed significant abnormality at the interval triangle.   The biceps demonstrated significant fibrillated partially torn tissue, although it was still attached at the supraglenoid tubercl most prominent, and this was beveled with a 4.0 barrel-tip bur after an [employer] radiofrequency device was used to ablate underlying soft tissue. The coracoacromial ligament was released partially. Deltoid fascia was left intact.   Decompression involve and felt to be adequate for a 6 mm socket. I selected a 6.25 mm [employer] tenodesis screw which was loaded on the tenodesis . A #2 FiberWire loop at the tip of the  was used to grasp the tip of the tendon.   I then identified the distal postanesthesia recovery room.      Dictated By Devonte Smith M.D.  d: 07/27/2021 16:17:37  t: 07/27/2021 21:32:49  [facility] [id number]  /

## 2021-07-29 ENCOUNTER — TELEPHONE (OUTPATIENT)
Dept: ORTHOPEDICS CLINIC | Facility: CLINIC | Age: 51
End: 2021-07-29

## 2021-07-29 NOTE — TELEPHONE ENCOUNTER
Patient is calling in regards to getting an appointment with Dr. Tran Kirk for a post op. Patient stated Dr. Tran Kirk was going to cancel 8/4 appointment and reschedule with him. Please call patient back.

## 2021-08-05 ENCOUNTER — OFFICE VISIT (OUTPATIENT)
Dept: ORTHOPEDICS CLINIC | Facility: CLINIC | Age: 51
End: 2021-08-05
Payer: OTHER MISCELLANEOUS

## 2021-08-05 DIAGNOSIS — Z98.890 S/P ARTHROSCOPY OF LEFT SHOULDER: Primary | ICD-10-CM

## 2021-08-05 PROCEDURE — 99024 POSTOP FOLLOW-UP VISIT: CPT | Performed by: ORTHOPAEDIC SURGERY

## 2021-08-05 NOTE — PROGRESS NOTES
[facility] Post-op Progress Note        Date of Surgery: 7/27/21        History: The patient is a 48year old male status post shoulder arthroscopy and rotator cuff debridement with open subpectoralis biceps tenodesis approximately 10 days ago. was partially prepared using [employer] voice recognition software. Errors may occur, which have been corrected where identified.

## 2021-08-10 ENCOUNTER — OFFICE VISIT (OUTPATIENT)
Dept: PHYSICAL THERAPY | Age: 51
End: 2021-08-10
Attending: ORTHOPAEDIC SURGERY
Payer: OTHER MISCELLANEOUS

## 2021-08-10 ENCOUNTER — TELEPHONE (OUTPATIENT)
Dept: PHYSICAL THERAPY | Facility: HOSPITAL | Age: 51
End: 2021-08-10

## 2021-08-10 DIAGNOSIS — S43.492D SPRAIN OF OTHER PART OF LEFT SHOULDER REGION, SUBSEQUENT ENCOUNTER: ICD-10-CM

## 2021-08-10 PROCEDURE — 97161 PT EVAL LOW COMPLEX 20 MIN: CPT | Performed by: PHYSICAL THERAPIST

## 2021-08-10 PROCEDURE — 97110 THERAPEUTIC EXERCISES: CPT | Performed by: PHYSICAL THERAPIST

## 2021-08-10 NOTE — PROGRESS NOTES
SHOULDER EVALUATION:   Referring Physician: Dr. Nikolas Vasquez  Diagnosis: S/P arthroscopy of left shoulder (N78.103)     Date of Service: 8/10/2021     PATIENT SUMMARY   Yue Watt is a 48year old male who presents to therapy today with complaints of m region. Incisions are closed. Patient had no difficulty donning/doffing sling.  Position changes made with ease  Palpation: Mild tenderness left anterior shoulder  Cervical Screen: Negative     PROM left shoulder: (* denotes performed with pain)  Shoulder signed by therapist: Brett Starr, PT  [de-identified] certification required: Yes  I certify the need for these services furnished under this plan of treatment and while under my care.     X___________________________________________________ Date_______________

## 2021-08-12 ENCOUNTER — OFFICE VISIT (OUTPATIENT)
Dept: PHYSICAL THERAPY | Age: 51
End: 2021-08-12
Attending: ORTHOPAEDIC SURGERY
Payer: OTHER MISCELLANEOUS

## 2021-08-12 DIAGNOSIS — Z98.890 S/P ARTHROSCOPY OF LEFT SHOULDER: ICD-10-CM

## 2021-08-12 PROCEDURE — 97110 THERAPEUTIC EXERCISES: CPT | Performed by: PHYSICAL THERAPIST

## 2021-08-12 NOTE — PROGRESS NOTES
Dx: S/P arthroscopy of left shoulder (L93.856)           Authorized # of Visits:  12  Fall Risk: standard         Precautions: PROM left shoulder, no biceps loading, follow protocol  Subjective:   Patient states that he felt good after the last session but

## 2021-08-17 ENCOUNTER — OFFICE VISIT (OUTPATIENT)
Dept: PHYSICAL THERAPY | Age: 51
End: 2021-08-17
Attending: ORTHOPAEDIC SURGERY
Payer: OTHER MISCELLANEOUS

## 2021-08-17 PROCEDURE — 97110 THERAPEUTIC EXERCISES: CPT | Performed by: PHYSICAL THERAPIST

## 2021-08-18 NOTE — PROGRESS NOTES
Dx: S/P arthroscopy of left shoulder (G60.918)           Authorized # of Visits:  12  Fall Risk: standard         Precautions: PROM left shoulder, no biceps loading, follow protocol  Subjective:   No pain. Objective:   PROM left shoulder as tolerated.  Litvimal Castellonbiljude

## 2021-08-19 ENCOUNTER — OFFICE VISIT (OUTPATIENT)
Dept: PHYSICAL THERAPY | Age: 51
End: 2021-08-19
Attending: ORTHOPAEDIC SURGERY
Payer: OTHER MISCELLANEOUS

## 2021-08-19 PROCEDURE — 97110 THERAPEUTIC EXERCISES: CPT | Performed by: PHYSICAL THERAPIST

## 2021-08-19 NOTE — PROGRESS NOTES
Dx: S/P arthroscopy of left shoulder (R38.055)           Authorized # of Visits:  12  Fall Risk: standard         Precautions: PROM left shoulder, no biceps loading, follow protocol  Subjective:   Patient states he has a little bit of pain that comes and g

## 2021-08-24 ENCOUNTER — OFFICE VISIT (OUTPATIENT)
Dept: PHYSICAL THERAPY | Age: 51
End: 2021-08-24
Attending: ORTHOPAEDIC SURGERY
Payer: OTHER MISCELLANEOUS

## 2021-08-24 PROCEDURE — 97110 THERAPEUTIC EXERCISES: CPT | Performed by: PHYSICAL THERAPIST

## 2021-08-25 NOTE — PROGRESS NOTES
Dx: S/P arthroscopy of left shoulder (J97.720)           Authorized # of Visits:  12  Fall Risk: standard         Precautions: PROM left shoulder, no biceps loading, follow protocol  Subjective:   Patient states he is doing pretty good today.  He has no ciro

## 2021-08-26 ENCOUNTER — TELEPHONE (OUTPATIENT)
Dept: ORTHOPEDICS CLINIC | Facility: CLINIC | Age: 51
End: 2021-08-26

## 2021-08-26 ENCOUNTER — OFFICE VISIT (OUTPATIENT)
Dept: PHYSICAL THERAPY | Age: 51
End: 2021-08-26
Attending: ORTHOPAEDIC SURGERY
Payer: OTHER MISCELLANEOUS

## 2021-08-26 PROCEDURE — 97110 THERAPEUTIC EXERCISES: CPT | Performed by: PHYSICAL THERAPIST

## 2021-08-26 NOTE — TELEPHONE ENCOUNTER
Jennifer Bateman from [facility] called regarding DME cold compression. Patient would like to pursue using the cold compression for 30 more days . Please advise if this is okay. Jennifer Bateman would like a call back to confirm status of DME .    Jennifer Bateman #[phone]

## 2021-08-26 NOTE — PROGRESS NOTES
Dx: S/P arthroscopy of left shoulder (W09.572)           Authorized # of Visits:  12  Fall Risk: standard         Precautions: PROM left shoulder, no biceps loading, follow protocol  Subjective:   No pain  Objective:   Right shoulder PROM: flexion 150, sca

## 2021-08-31 ENCOUNTER — OFFICE VISIT (OUTPATIENT)
Dept: PHYSICAL THERAPY | Age: 51
End: 2021-08-31
Attending: ORTHOPAEDIC SURGERY
Payer: OTHER MISCELLANEOUS

## 2021-08-31 PROCEDURE — 97110 THERAPEUTIC EXERCISES: CPT | Performed by: PHYSICAL THERAPIST

## 2021-08-31 NOTE — PROGRESS NOTES
Dx: S/P arthroscopy of left shoulder (X19.002)           Authorized # of Visits:  12  Fall Risk: standard         Precautions: PROM left shoulder, no biceps loading, follow protocol   Progress Summary  Pt has attended 7 visits in Physical Therapy.    Subjec 20 reps - no pain until end ROM  Pendulum - no pain                                                                 Charges:  TherEx 2       Total Timed Treatment: 30 min  Total Treatment Time: 30 min

## 2021-09-02 ENCOUNTER — OFFICE VISIT (OUTPATIENT)
Dept: ORTHOPEDICS CLINIC | Facility: CLINIC | Age: 51
End: 2021-09-02
Payer: OTHER MISCELLANEOUS

## 2021-09-02 VITALS — BODY MASS INDEX: 30.06 KG/M2 | HEIGHT: 70 IN | WEIGHT: 210 LBS

## 2021-09-02 DIAGNOSIS — Z48.89 AFTERCARE FOLLOWING SURGERY: Primary | ICD-10-CM

## 2021-09-02 PROCEDURE — 99024 POSTOP FOLLOW-UP VISIT: CPT | Performed by: ORTHOPAEDIC SURGERY

## 2021-09-02 PROCEDURE — 3008F BODY MASS INDEX DOCD: CPT | Performed by: ORTHOPAEDIC SURGERY

## 2021-09-02 NOTE — PROGRESS NOTES
[facility] Post-op Progress Note      Date of Surgery: 7/27/2021      History: The patient is a 51-year-old male status post arthroscopy approximately 5 weeks ago.   He is tolerating physical therapy and is using the sling only when out and abou

## 2021-09-08 ENCOUNTER — OFFICE VISIT (OUTPATIENT)
Dept: PHYSICAL THERAPY | Age: 51
End: 2021-09-08
Attending: ORTHOPAEDIC SURGERY
Payer: OTHER MISCELLANEOUS

## 2021-09-08 DIAGNOSIS — S43.492D SPRAIN OF OTHER PART OF LEFT SHOULDER REGION, SUBSEQUENT ENCOUNTER: ICD-10-CM

## 2021-09-08 PROCEDURE — 97110 THERAPEUTIC EXERCISES: CPT | Performed by: PHYSICAL THERAPIST

## 2021-09-08 NOTE — PROGRESS NOTES
Dx: S/P arthroscopy of left shoulder (Q28.675)           Authorized # of Visits:  12  Fall Risk: standard         Precautions: no biceps loading left UE    Subjective:   No pain. Patient states he returned to work on light duty yesterday.  He no longer need

## 2021-09-13 ENCOUNTER — OFFICE VISIT (OUTPATIENT)
Dept: PHYSICAL THERAPY | Age: 51
End: 2021-09-13
Attending: ORTHOPAEDIC SURGERY
Payer: OTHER MISCELLANEOUS

## 2021-09-13 DIAGNOSIS — S43.492D SPRAIN OF OTHER PART OF LEFT SHOULDER REGION, SUBSEQUENT ENCOUNTER: ICD-10-CM

## 2021-09-13 PROCEDURE — 97110 THERAPEUTIC EXERCISES: CPT | Performed by: PHYSICAL THERAPIST

## 2021-09-13 NOTE — PROGRESS NOTES
Dx: S/P arthroscopy of left shoulder (I80.762)           Authorized # of Visits:  12  Fall Risk: standard         Precautions: no biceps loading left UE    Subjective:   No pain   Objective:   No pain with isometric IR/ER.  Attempted progression to light ba

## 2021-09-15 ENCOUNTER — OFFICE VISIT (OUTPATIENT)
Dept: PHYSICAL THERAPY | Age: 51
End: 2021-09-15
Attending: ORTHOPAEDIC SURGERY
Payer: OTHER MISCELLANEOUS

## 2021-09-15 DIAGNOSIS — S43.492D SPRAIN OF OTHER PART OF LEFT SHOULDER REGION, SUBSEQUENT ENCOUNTER: ICD-10-CM

## 2021-09-15 PROCEDURE — 97110 THERAPEUTIC EXERCISES: CPT | Performed by: PHYSICAL THERAPIST

## 2021-09-15 NOTE — PROGRESS NOTES
Dx: S/P arthroscopy of left shoulder (S28.517)           Authorized # of Visits:  12  Fall Risk: standard         Precautions: no biceps loading left UE    Subjective:   Patient states his arm is pretty good. Objective:   Left shoulder ROM WNL.  Mild pain Standing scap plane abduction in pain free ROM 20 reps x 3 - 5 second hold                              Charges:  TherEx 3      Total Timed Treatment: 40 min  Total Treatment Time: 40 min

## 2021-09-20 ENCOUNTER — APPOINTMENT (OUTPATIENT)
Dept: PHYSICAL THERAPY | Age: 51
End: 2021-09-20
Attending: ORTHOPAEDIC SURGERY
Payer: OTHER MISCELLANEOUS

## 2021-09-22 ENCOUNTER — OFFICE VISIT (OUTPATIENT)
Dept: PHYSICAL THERAPY | Age: 51
End: 2021-09-22
Attending: ORTHOPAEDIC SURGERY
Payer: OTHER MISCELLANEOUS

## 2021-09-22 DIAGNOSIS — S43.492D SPRAIN OF OTHER PART OF LEFT SHOULDER REGION, SUBSEQUENT ENCOUNTER: ICD-10-CM

## 2021-09-22 PROCEDURE — 97110 THERAPEUTIC EXERCISES: CPT | Performed by: PHYSICAL THERAPIST

## 2021-09-22 NOTE — PROGRESS NOTES
Dx: S/P arthroscopy of left shoulder (U88.974)           Authorized # of Visits:  12  Fall Risk: standard         Precautions: no biceps loading left UE    Subjective:   No pain.  Patient states he has started to roll to his left shoulder at night   Objecti 3 - 5 second hold                           Charges:  TherEx 2      Total Timed Treatment: 30 min  Total Treatment Time: 30 min

## 2021-09-27 ENCOUNTER — APPOINTMENT (OUTPATIENT)
Dept: PHYSICAL THERAPY | Age: 51
End: 2021-09-27
Attending: ORTHOPAEDIC SURGERY
Payer: OTHER MISCELLANEOUS

## 2021-09-29 ENCOUNTER — OFFICE VISIT (OUTPATIENT)
Dept: PHYSICAL THERAPY | Age: 51
End: 2021-09-29
Attending: ORTHOPAEDIC SURGERY
Payer: OTHER MISCELLANEOUS

## 2021-09-29 DIAGNOSIS — S43.492D SPRAIN OF OTHER PART OF LEFT SHOULDER REGION, SUBSEQUENT ENCOUNTER: ICD-10-CM

## 2021-09-29 PROCEDURE — 97110 THERAPEUTIC EXERCISES: CPT | Performed by: PHYSICAL THERAPIST

## 2021-09-29 NOTE — PROGRESS NOTES
Dx: S/P arthroscopy of left shoulder (D76.350)           Authorized # of Visits:  12  Fall Risk: standard         Precautions: no biceps loading left UE   Progress Summary  Pt has attended 12 visits in Physical Therapy.    Subjective:   Patient states that treatment and while under my care.     X___________________________________________________ Date____________________    Certification From: 7/94/5832  To:12/28/2021             Date: 8/24/2021  Tx#: 5/12 Date: 8/26/2021  Tx#: 6/12 Date: 8/31/2021  Tx#: 7/12 min

## 2021-09-30 ENCOUNTER — OFFICE VISIT (OUTPATIENT)
Dept: ORTHOPEDICS CLINIC | Facility: CLINIC | Age: 51
End: 2021-09-30
Payer: OTHER MISCELLANEOUS

## 2021-09-30 VITALS — HEIGHT: 70 IN | WEIGHT: 210 LBS | BODY MASS INDEX: 30.06 KG/M2

## 2021-09-30 DIAGNOSIS — Z98.890 S/P ARTHROSCOPY OF LEFT SHOULDER: Primary | ICD-10-CM

## 2021-09-30 DIAGNOSIS — Z48.89 AFTERCARE FOLLOWING SURGERY: ICD-10-CM

## 2021-09-30 PROCEDURE — 3008F BODY MASS INDEX DOCD: CPT | Performed by: ORTHOPAEDIC SURGERY

## 2021-09-30 PROCEDURE — 99024 POSTOP FOLLOW-UP VISIT: CPT | Performed by: ORTHOPAEDIC SURGERY

## 2021-09-30 NOTE — PROGRESS NOTES
[facility] Post-op Progress Note      Date of Surgery: 7/27/2021      History: Charbel Fine is a 24-year-old male presenting for postop follow-up approximately 2 months status post arthroscopic decompression and biceps tendon tenodesis.   He is toleratin

## 2021-10-04 ENCOUNTER — TELEPHONE (OUTPATIENT)
Dept: PHYSICAL THERAPY | Facility: HOSPITAL | Age: 51
End: 2021-10-04

## 2021-10-04 ENCOUNTER — APPOINTMENT (OUTPATIENT)
Dept: PHYSICAL THERAPY | Age: 51
End: 2021-10-04
Attending: ORTHOPAEDIC SURGERY
Payer: OTHER MISCELLANEOUS

## 2021-10-06 ENCOUNTER — OFFICE VISIT (OUTPATIENT)
Dept: PHYSICAL THERAPY | Age: 51
End: 2021-10-06
Attending: ORTHOPAEDIC SURGERY
Payer: OTHER MISCELLANEOUS

## 2021-10-06 PROCEDURE — 97110 THERAPEUTIC EXERCISES: CPT | Performed by: PHYSICAL THERAPIST

## 2021-10-06 NOTE — PROGRESS NOTES
Dx: S/P arthroscopy of left shoulder (H71.971)           Authorized # of Visits:  16  Fall Risk: standard         Precautions: no biceps loading left UE    Subjective:   Patient states his arm is feeling good  Objective:   Left shoulder - no pain with PROM IR 10 green band 10 reps x 3  Prone shoulder extension 30 reps   Prone MT 30 reps        Scapular retraction with blue band 15 reps x 2  Prone MT 30 reps  Base position ER green band 10 reps x 3  Base position ER green band 10 reps x 3  Standing scap plane

## 2021-10-13 ENCOUNTER — OFFICE VISIT (OUTPATIENT)
Dept: PHYSICAL THERAPY | Age: 51
End: 2021-10-13
Attending: ORTHOPAEDIC SURGERY
Payer: OTHER MISCELLANEOUS

## 2021-10-13 PROCEDURE — 97110 THERAPEUTIC EXERCISES: CPT | Performed by: PHYSICAL THERAPIST

## 2021-10-13 NOTE — PROGRESS NOTES
Dx: S/P arthroscopy of left shoulder (T65.596)           Authorized # of Visits:  16  Fall Risk: standard         Precautions: no biceps loading left UE    Subjective:   No pain   Objective:   Mild pain with eccentric left shoulder abduction.  No pain with ER green band 10 reps x 3  Standing scap plane abduction  Shoulder height > 2 reps with 5 second hold  Left shoulder IR with cable 12 pounds 10 reps x 3      Standing scap plane abduction in pain free ROM 20 reps x 3 - 5 second hold   Base position left sh

## 2021-10-18 ENCOUNTER — APPOINTMENT (OUTPATIENT)
Dept: PHYSICAL THERAPY | Age: 51
End: 2021-10-18
Attending: ORTHOPAEDIC SURGERY
Payer: OTHER MISCELLANEOUS

## 2021-10-20 ENCOUNTER — OFFICE VISIT (OUTPATIENT)
Dept: PHYSICAL THERAPY | Age: 51
End: 2021-10-20
Attending: ORTHOPAEDIC SURGERY
Payer: OTHER MISCELLANEOUS

## 2021-10-20 PROCEDURE — 97110 THERAPEUTIC EXERCISES: CPT | Performed by: PHYSICAL THERAPIST

## 2021-10-20 NOTE — PROGRESS NOTES
Dx: S/P arthroscopy of left shoulder (M41.669)           Authorized # of Visits:  16  Fall Risk: standard         Precautions: no biceps loading left UE    Subjective:   No pain   Objective:   Full left shoulder ROM - no pain       Assessment:  The patient 10 reps x 3  Shoulder extension 30 reps     Scapular retraction with blue band 15 reps x 2  Prone MT 30 reps  Base position ER green band 10 reps x 3  Base position ER green band 10 reps x 3  Standing scap plane abduction  Shoulder height > 2 reps with 5 s

## 2021-10-25 ENCOUNTER — APPOINTMENT (OUTPATIENT)
Dept: PHYSICAL THERAPY | Age: 51
End: 2021-10-25
Attending: ORTHOPAEDIC SURGERY
Payer: OTHER MISCELLANEOUS

## 2021-10-27 ENCOUNTER — OFFICE VISIT (OUTPATIENT)
Dept: PHYSICAL THERAPY | Age: 51
End: 2021-10-27
Attending: ORTHOPAEDIC SURGERY
Payer: OTHER MISCELLANEOUS

## 2021-10-27 PROCEDURE — 97110 THERAPEUTIC EXERCISES: CPT | Performed by: PHYSICAL THERAPIST

## 2021-10-27 NOTE — PROGRESS NOTES
Dx: S/P arthroscopy of left shoulder (L43.519)           Authorized # of Visits:  16  Fall Risk: standard         Precautions: no biceps loading left UE   Progress Summary  Pt has attended 16 visits in Physical Therapy. Subjective:   No pain.  Patient sta with pulley AAROM with pulley AAROM with pulley UBE 6 minutes   PROM left shoulder  UBE 6 minutes  UBE 6 minutes  UBE 6 minutes    Supine left shoulder PROM Supine left shoulder PROM PROM left shoulder  PROM left shoulder Cane MR behind back  SL left ER 2 wall 10 reps x 2 with 3 second hold LT on wall 20 reps     Scap plane abduction 10 reps AAROM, 10 reps x 2 AROM IR with cable 24 pounds 10 reps x 3        Scap plane  Abduction standing 10 reps x 2 AROM, 10 reps x 2 AAROM  Rhythmic stabilization left shoul

## 2021-10-28 ENCOUNTER — OFFICE VISIT (OUTPATIENT)
Dept: ORTHOPEDICS CLINIC | Facility: CLINIC | Age: 51
End: 2021-10-28
Payer: OTHER MISCELLANEOUS

## 2021-10-28 VITALS — HEIGHT: 70 IN | BODY MASS INDEX: 30.78 KG/M2 | WEIGHT: 215 LBS

## 2021-10-28 DIAGNOSIS — R29.898 WEAKNESS OF LEFT ARM: ICD-10-CM

## 2021-10-28 DIAGNOSIS — Z09 FOLLOW-UP EXAMINATION, FOLLOWING OTHER SURGERY: Primary | ICD-10-CM

## 2021-10-28 PROCEDURE — 99213 OFFICE O/P EST LOW 20 MIN: CPT | Performed by: ORTHOPAEDIC SURGERY

## 2021-10-28 PROCEDURE — 3008F BODY MASS INDEX DOCD: CPT | Performed by: ORTHOPAEDIC SURGERY

## 2021-10-28 NOTE — PROGRESS NOTES
[facility] Post-op Progress Note      Date of Surgery: 7/27/21      History: Vy Pappas is a 59-year-old male presenting for postop follow-up approximately 3-month status post arthroscopy.   Overall he reports minimal residual pain and good tolerance o

## 2021-11-01 ENCOUNTER — OFFICE VISIT (OUTPATIENT)
Dept: PHYSICAL THERAPY | Age: 51
End: 2021-11-01
Attending: ORTHOPAEDIC SURGERY
Payer: OTHER MISCELLANEOUS

## 2021-11-01 PROCEDURE — 97110 THERAPEUTIC EXERCISES: CPT | Performed by: PHYSICAL THERAPIST

## 2021-11-01 NOTE — PROGRESS NOTES
Dx: S/P arthroscopy of left shoulder (E30.825)           Authorized # of Visits:  24  Fall Risk: standard         Precautions: Gradual rotator cuff/ biceps strengthening/conditioning as MARTÍN - 10/28/2021    Subjective:   No pain.  Patient states the Dr. Chava Majano x 2    Horizontal abduction blue band 10 reps x 3          Base position ER green band 10 reps x 3  Base position ER green band 10 reps x 3  Standing scap plane abduction  Shoulder height > 2 reps with 5 second hold  Left shoulder IR with cable 12 pounds 1

## 2021-11-02 ENCOUNTER — TELEPHONE (OUTPATIENT)
Dept: PHYSICAL THERAPY | Facility: HOSPITAL | Age: 51
End: 2021-11-02

## 2021-11-03 ENCOUNTER — OFFICE VISIT (OUTPATIENT)
Dept: PHYSICAL THERAPY | Age: 51
End: 2021-11-03
Attending: ORTHOPAEDIC SURGERY
Payer: OTHER MISCELLANEOUS

## 2021-11-03 PROCEDURE — 97110 THERAPEUTIC EXERCISES: CPT | Performed by: PHYSICAL THERAPIST

## 2021-11-03 NOTE — PROGRESS NOTES
Dx: S/P arthroscopy of left shoulder (V19.420)           Authorized # of Visits:  24  Fall Risk: standard         Precautions: Gradual rotator cuff/ biceps strengthening/conditioning as MARTÍN - 10/28/2021    Subjective:   Pain 0/10.  Patient states he did not band 10 reps x 3        Prone LT 20 reps each    Base position ER green band 10 reps x 3  Base position ER green band 10 reps x 3  Standing scap plane abduction  Shoulder height > 2 reps with 5 second hold  Left shoulder IR with cable 12 pounds 10 reps x 3

## 2021-11-08 ENCOUNTER — OFFICE VISIT (OUTPATIENT)
Dept: PHYSICAL THERAPY | Age: 51
End: 2021-11-08
Attending: ORTHOPAEDIC SURGERY
Payer: OTHER MISCELLANEOUS

## 2021-11-08 PROCEDURE — 97110 THERAPEUTIC EXERCISES: CPT | Performed by: PHYSICAL THERAPIST

## 2021-11-08 NOTE — PROGRESS NOTES
Dx: S/P arthroscopy of left shoulder (V41.319)           Authorized # of Visits:  24  Fall Risk: standard         Precautions: Gradual rotator cuff/ biceps strengthening/conditioning as MARTÍN - 10/28/2021    Subjective:   No  Pain today.  Patient states he ha left shoulder   Base position left shoulder IR 10 green band 10 reps x 3  Prone shoulder extension 30 reps   Prone MT 30 reps  SL left shoulder ER 3 pounds 10 reps x 3  Shoulder extension 30 reps  Prone LT 10 reps x 2    Horizontal abduction blue band 10 r Bilat pulldown 96 pounds 10 reps x 3          Left UE high pull 48 pounds 10 reps x 3  5 pounds one arm lift overhead 20 reps            Cable left shoulder IR 24 pounds 10 reps x 3            3 pounds scap plane abduction 10 reps x 3            72 pounds

## 2021-11-10 ENCOUNTER — OFFICE VISIT (OUTPATIENT)
Dept: PHYSICAL THERAPY | Age: 51
End: 2021-11-10
Attending: ORTHOPAEDIC SURGERY
Payer: OTHER MISCELLANEOUS

## 2021-11-10 PROCEDURE — 97110 THERAPEUTIC EXERCISES: CPT | Performed by: PHYSICAL THERAPIST

## 2021-11-10 NOTE — PROGRESS NOTES
Dx: S/P arthroscopy of left shoulder (Z18.618)           Authorized # of Visits:  24  Fall Risk: standard         Precautions: Gradual rotator cuff/ biceps strengthening/conditioning as MARTÍN - 10/28/2021    Subjective:   Left shoulder pain 1/10.  Patient sta reps each  Bicep curl 18 pound bar 10 reps x 4  One arm cable press 36 pounds 10 reps x 4 left UE   Left shoulder IR with cable 12 pounds 10 reps x 3  MT 30 reps   Active shoulder flexion/abduction in standing  Blue band bicep curl 10 reps x 3 left UE  Bic abduction 10 reps x 3          72 pounds triceps with cable 10 reps x 3          TRX retraction 10 reps x 2          Charges:  TherEx 3     Total Timed Treatment: 45  min  Total Treatment Time: 45  min

## 2021-11-15 ENCOUNTER — OFFICE VISIT (OUTPATIENT)
Dept: PHYSICAL THERAPY | Age: 51
End: 2021-11-15
Attending: ORTHOPAEDIC SURGERY
Payer: OTHER MISCELLANEOUS

## 2021-11-15 PROCEDURE — 97110 THERAPEUTIC EXERCISES: CPT | Performed by: PHYSICAL THERAPIST

## 2021-11-15 NOTE — PROGRESS NOTES
Dx: S/P arthroscopy of left shoulder (D06.953)           Authorized # of Visits:  24  Fall Risk: standard         Precautions: Gradual rotator cuff/ biceps strengthening/conditioning as MARTÍN - 10/28/2021    Subjective:   Left shoulder pain 0/10   Objective: 10 reps x 3        Prone LT 20 reps each  Bicep curl 18 pound bar 10 reps x 4  One arm cable press 36 pounds 10 reps x 4 left UE Low pull bilat hold 72 pounds 10 reps x 4    Left shoulder IR with cable 12 pounds 10 reps x 3  MT 30 reps   Active shoulder fl suitcase carry > 60 feet right and left UE  12 bar overhead press bilat hold 20 reps  Bilat pulldown 96 pounds 10 reps x 3  SL left shoulder/scap PROM       Left UE high pull 48 pounds 10 reps x 3  5 pounds one arm lift overhead 20 reps           Cable lef

## 2021-11-17 ENCOUNTER — OFFICE VISIT (OUTPATIENT)
Dept: PHYSICAL THERAPY | Age: 51
End: 2021-11-17
Attending: ORTHOPAEDIC SURGERY
Payer: OTHER MISCELLANEOUS

## 2021-11-17 PROCEDURE — 97110 THERAPEUTIC EXERCISES: CPT | Performed by: PHYSICAL THERAPIST

## 2021-11-17 NOTE — PROGRESS NOTES
Dx: S/P arthroscopy of left shoulder (P08.519)           Authorized # of Visits:  24  Fall Risk: standard         Precautions: Gradual rotator cuff/ biceps strengthening/conditioning as MARTÍN - 10/28/2021    Subjective:   Left shoulder pain 0/10   Objective: Bicep curl 18 pound bar 10 reps x 4  One arm cable press 36 pounds 10 reps x 4 left UE Low pull bilat hold 72 pounds 10 reps x 4  Bench press 26 pounds 10 reps x 4    Left shoulder IR with cable 12 pounds 10 reps x 3  MT 30 reps   Active shoulder flexion/ 100 feet       Base position IR blue band 10 reps x 3  Cable high pull 60 pounds 10 reps x 3  Low pull 72 pounds  10reps x 3  Base position ER green band 10 reps x 3 bilat  High pull 84 pounds 10 reps x 3 right and leftUE      20 pound suitcase carry > 60

## 2021-11-22 ENCOUNTER — OFFICE VISIT (OUTPATIENT)
Dept: PHYSICAL THERAPY | Age: 51
End: 2021-11-22
Attending: ORTHOPAEDIC SURGERY
Payer: OTHER MISCELLANEOUS

## 2021-11-22 PROCEDURE — 97110 THERAPEUTIC EXERCISES: CPT | Performed by: PHYSICAL THERAPIST

## 2021-11-22 NOTE — PROGRESS NOTES
Dx: S/P arthroscopy of left shoulder (Q08.971)           Authorized # of Visits:  24  Fall Risk: standard         Precautions: Gradual rotator cuff/ biceps strengthening/conditioning as MARTÍN - 10/28/2021    Subjective:   Patient states his arm is at 85%   O reps x 3 bilat hold  9 pound bar overhead press bilat hold 10 reps x 3  Bilateral hold upright pull 72 pounds 10 reps x 3  12 pound bar bilateral hold over head lift 10 reps x 4  TRX retraction 10 reps x 3  Bilat hold bicep curl 26 pounds 10 reps x 4    Ba reps          Cable left shoulder IR 24 pounds 10 reps x 3          3 pounds scap plane abduction 10 reps x 3          72 pounds triceps with cable 10 reps x 3           TRX retraction 10 reps x 2             Charges:  TherEx 3     Total Timed Treatment: 40

## 2021-11-24 ENCOUNTER — APPOINTMENT (OUTPATIENT)
Dept: PHYSICAL THERAPY | Age: 51
End: 2021-11-24
Attending: ORTHOPAEDIC SURGERY
Payer: OTHER MISCELLANEOUS

## 2021-11-29 ENCOUNTER — APPOINTMENT (OUTPATIENT)
Dept: PHYSICAL THERAPY | Age: 51
End: 2021-11-29
Payer: OTHER MISCELLANEOUS

## 2021-11-30 ENCOUNTER — OFFICE VISIT (OUTPATIENT)
Dept: PHYSICAL THERAPY | Age: 51
End: 2021-11-30
Attending: ORTHOPAEDIC SURGERY
Payer: OTHER MISCELLANEOUS

## 2021-11-30 PROCEDURE — 97110 THERAPEUTIC EXERCISES: CPT | Performed by: PHYSICAL THERAPIST

## 2021-11-30 NOTE — PROGRESS NOTES
Dx: S/P arthroscopy of left shoulder (B22.466)           Authorized # of Visits:  24  Fall Risk: standard         Precautions: Gradual rotator cuff/ biceps strengthening/conditioning as MARTÍN - 10/28/2021   Discharge Summary  Pt has attended 24 visits in [facility] care.    X___________________________________________________ Date____________________    Certification From: 61/08/9970  To:2/28/2022            Date: 11/1/2021  Tx#: 17 Date: 11/3/2021  Tx#: 18 11/8/2021  Tx#: 19 11/10/2021  Tx#: 20 Date: 11/15/2021  Tx# shoulder ER with blue band 10 reps x 3 bilat    Cable pulldown 84 pounds 15 reps x 2  120 pounds seated row 10 reps x 4  Horizontal abduction blue band 10 reps x 3        Chop 10 pound medicine ball split stance 10 reps x1, 6 pound medicine ball 10 reps ea 24 pounds 10 reps x 3           3 pounds scap plane abduction 10 reps x 3           72 pounds triceps with cable 10 reps x 3            TRX retraction 10 reps x 2              Charges:  TherEx 3     Total Timed Treatment: 45  min  Total Treatment Time: 45

## 2021-12-01 ENCOUNTER — OFFICE VISIT (OUTPATIENT)
Dept: ORTHOPEDICS CLINIC | Facility: CLINIC | Age: 51
End: 2021-12-01
Payer: OTHER MISCELLANEOUS

## 2021-12-01 DIAGNOSIS — Z09 FOLLOW-UP EXAMINATION, FOLLOWING OTHER SURGERY: Primary | ICD-10-CM

## 2021-12-01 DIAGNOSIS — R29.898 WEAKNESS OF LEFT ARM: ICD-10-CM

## 2021-12-01 PROCEDURE — 99213 OFFICE O/P EST LOW 20 MIN: CPT | Performed by: ORTHOPAEDIC SURGERY

## 2021-12-01 RX ORDER — LISINOPRIL 20 MG/1
TABLET ORAL
COMMUNITY
Start: 2021-10-10

## 2021-12-01 NOTE — PROGRESS NOTES
[facility] Post-op Progress Note      Date of Surgery: 7/27/2021      History: Jenn Riggs is now a 45-year-old male status post left shoulder arthroscopy plus open biceps tenodesis. He reports good tolerance of physical therapy and light duty.   No ne Name band;

## 2021-12-07 ENCOUNTER — OFFICE VISIT (OUTPATIENT)
Dept: PHYSICAL THERAPY | Age: 51
End: 2021-12-07
Attending: ORTHOPAEDIC SURGERY
Payer: OTHER MISCELLANEOUS

## 2021-12-07 DIAGNOSIS — R29.898 WEAKNESS OF LEFT ARM: ICD-10-CM

## 2021-12-07 DIAGNOSIS — Z09 FOLLOW-UP EXAMINATION, FOLLOWING OTHER SURGERY: ICD-10-CM

## 2021-12-07 PROCEDURE — 97110 THERAPEUTIC EXERCISES: CPT | Performed by: PHYSICAL THERAPIST

## 2021-12-07 NOTE — PROGRESS NOTES
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

## 2021-12-14 ENCOUNTER — TELEPHONE (OUTPATIENT)
Dept: PHYSICAL THERAPY | Facility: HOSPITAL | Age: 51
End: 2021-12-14

## 2021-12-14 ENCOUNTER — APPOINTMENT (OUTPATIENT)
Dept: PHYSICAL THERAPY | Age: 51
End: 2021-12-14
Attending: ORTHOPAEDIC SURGERY
Payer: OTHER MISCELLANEOUS

## 2021-12-22 ENCOUNTER — OFFICE VISIT (OUTPATIENT)
Dept: PHYSICAL THERAPY | Age: 51
End: 2021-12-22
Attending: ORTHOPAEDIC SURGERY
Payer: OTHER MISCELLANEOUS

## 2021-12-22 PROCEDURE — 97110 THERAPEUTIC EXERCISES: CPT | Performed by: PHYSICAL THERAPIST

## 2021-12-22 NOTE — PROGRESS NOTES
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

## 2021-12-29 ENCOUNTER — APPOINTMENT (OUTPATIENT)
Dept: PHYSICAL THERAPY | Age: 51
End: 2021-12-29
Attending: ORTHOPAEDIC SURGERY
Payer: OTHER MISCELLANEOUS

## 2021-12-29 ENCOUNTER — TELEPHONE (OUTPATIENT)
Dept: PHYSICAL THERAPY | Age: 51
End: 2021-12-29

## 2022-01-05 ENCOUNTER — OFFICE VISIT (OUTPATIENT)
Dept: ORTHOPEDICS CLINIC | Facility: CLINIC | Age: 52
End: 2022-01-05
Payer: OTHER MISCELLANEOUS

## 2022-01-05 DIAGNOSIS — Z09 FOLLOW-UP EXAMINATION, FOLLOWING OTHER SURGERY: Primary | ICD-10-CM

## 2022-01-05 PROCEDURE — 99213 OFFICE O/P EST LOW 20 MIN: CPT | Performed by: ORTHOPAEDIC SURGERY

## 2022-01-05 NOTE — PROGRESS NOTES
[facility] Post-op Progress Note      Date of Surgery: 7/27/2021      History: Rosa Elena Horvath is a 22-year-old male presenting for postop follow-up after undergoing left shoulder arthroscopic decompression and debridement followed by open pectoralis bicep

## 2023-04-17 ENCOUNTER — HOSPITAL ENCOUNTER (EMERGENCY)
Age: 53
Discharge: HOME | End: 2023-04-17
Attending: EMERGENCY MEDICINE
Payer: COMMERCIAL

## 2023-04-17 ENCOUNTER — APPOINTMENT (OUTPATIENT)
Dept: GENERAL RADIOLOGY | Age: 53
End: 2023-04-17
Payer: COMMERCIAL

## 2023-04-17 VITALS
SYSTOLIC BLOOD PRESSURE: 151 MMHG | BODY MASS INDEX: 30.06 KG/M2 | TEMPERATURE: 97 F | WEIGHT: 210 LBS | HEIGHT: 70 IN | HEART RATE: 70 BPM | OXYGEN SATURATION: 99 % | RESPIRATION RATE: 16 BRPM | DIASTOLIC BLOOD PRESSURE: 88 MMHG

## 2023-04-17 DIAGNOSIS — R07.89 CHEST PAIN, ATYPICAL: Primary | ICD-10-CM

## 2023-04-17 LAB
ALBUMIN SERPL-MCNC: 4.1 G/DL (ref 3.4–5)
ALBUMIN/GLOB SERPL: 1.3 {RATIO} (ref 1–2)
ALP LIVER SERPL-CCNC: 82 U/L
ALT SERPL-CCNC: 42 U/L
ANION GAP SERPL CALC-SCNC: 6 MMOL/L (ref 0–18)
AST SERPL-CCNC: 28 U/L (ref 15–37)
BASOPHILS # BLD AUTO: 0.04 X10(3) UL (ref 0–0.2)
BASOPHILS NFR BLD AUTO: 0.5 %
BILIRUB SERPL-MCNC: 0.3 MG/DL (ref 0.1–2)
BILIRUB UR QL STRIP.AUTO: NEGATIVE
BUN BLD-MCNC: 17 MG/DL (ref 7–18)
CALCIUM BLD-MCNC: 8.8 MG/DL (ref 8.5–10.1)
CHLORIDE SERPL-SCNC: 108 MMOL/L (ref 98–112)
CLARITY UR REFRACT.AUTO: CLEAR
CO2 SERPL-SCNC: 24 MMOL/L (ref 21–32)
COLOR UR AUTO: YELLOW
CREAT BLD-MCNC: 0.93 MG/DL
D DIMER PPP FEU-MCNC: <0.27 UG/ML FEU (ref ?–0.52)
EOSINOPHIL # BLD AUTO: 0.16 X10(3) UL (ref 0–0.7)
EOSINOPHIL NFR BLD AUTO: 1.8 %
ERYTHROCYTE [DISTWIDTH] IN BLOOD BY AUTOMATED COUNT: 11.9 %
GFR SERPLBLD BASED ON 1.73 SQ M-ARVRAT: 99 ML/MIN/1.73M2 (ref 60–?)
GLOBULIN PLAS-MCNC: 3.2 G/DL (ref 2.8–4.4)
GLUCOSE BLD-MCNC: 104 MG/DL (ref 70–99)
GLUCOSE UR STRIP.AUTO-MCNC: NEGATIVE MG/DL
HCT VFR BLD AUTO: 39.9 %
HGB BLD-MCNC: 13.4 G/DL
IMM GRANULOCYTES # BLD AUTO: 0.02 X10(3) UL (ref 0–1)
IMM GRANULOCYTES NFR BLD: 0.2 %
KETONES UR STRIP.AUTO-MCNC: NEGATIVE MG/DL
LEUKOCYTE ESTERASE UR QL STRIP.AUTO: NEGATIVE
LYMPHOCYTES # BLD AUTO: 2.82 X10(3) UL (ref 1–4)
LYMPHOCYTES NFR BLD AUTO: 31.8 %
MCH RBC QN AUTO: 29.6 PG (ref 26–34)
MCHC RBC AUTO-ENTMCNC: 33.6 G/DL (ref 31–37)
MCV RBC AUTO: 88.1 FL
MONOCYTES # BLD AUTO: 0.83 X10(3) UL (ref 0.1–1)
MONOCYTES NFR BLD AUTO: 9.3 %
NEUTROPHILS # BLD AUTO: 5.01 X10 (3) UL (ref 1.5–7.7)
NEUTROPHILS # BLD AUTO: 5.01 X10(3) UL (ref 1.5–7.7)
NEUTROPHILS NFR BLD AUTO: 56.4 %
NITRITE UR QL STRIP.AUTO: NEGATIVE
OSMOLALITY SERPL CALC.SUM OF ELEC: 288 MOSM/KG (ref 275–295)
PH UR STRIP.AUTO: 5.5 [PH] (ref 5–8)
PLATELET # BLD AUTO: 309 10(3)UL (ref 150–450)
POTASSIUM SERPL-SCNC: 3.7 MMOL/L (ref 3.5–5.1)
PROT SERPL-MCNC: 7.3 G/DL (ref 6.4–8.2)
PROT UR STRIP.AUTO-MCNC: NEGATIVE MG/DL
RBC # BLD AUTO: 4.53 X10(6)UL
RBC UR QL AUTO: NEGATIVE
SODIUM SERPL-SCNC: 138 MMOL/L (ref 136–145)
SP GR UR STRIP.AUTO: >=1.03 (ref 1–1.03)
TROPONIN I HIGH SENSITIVITY: 3 NG/L
TROPONIN I HIGH SENSITIVITY: 5 NG/L
UROBILINOGEN UR STRIP.AUTO-MCNC: 0.2 MG/DL
WBC # BLD AUTO: 8.9 X10(3) UL (ref 4–11)

## 2023-04-17 PROCEDURE — 93010 ELECTROCARDIOGRAM REPORT: CPT

## 2023-04-17 PROCEDURE — 84484 ASSAY OF TROPONIN QUANT: CPT | Performed by: EMERGENCY MEDICINE

## 2023-04-17 PROCEDURE — 85025 COMPLETE CBC W/AUTO DIFF WBC: CPT | Performed by: EMERGENCY MEDICINE

## 2023-04-17 PROCEDURE — 81003 URINALYSIS AUTO W/O SCOPE: CPT | Performed by: EMERGENCY MEDICINE

## 2023-04-17 PROCEDURE — 36415 COLL VENOUS BLD VENIPUNCTURE: CPT

## 2023-04-17 PROCEDURE — 80053 COMPREHEN METABOLIC PANEL: CPT | Performed by: EMERGENCY MEDICINE

## 2023-04-17 PROCEDURE — 99284 EMERGENCY DEPT VISIT MOD MDM: CPT

## 2023-04-17 PROCEDURE — 80053 COMPREHEN METABOLIC PANEL: CPT

## 2023-04-17 PROCEDURE — 71045 X-RAY EXAM CHEST 1 VIEW: CPT

## 2023-04-17 PROCEDURE — 93005 ELECTROCARDIOGRAM TRACING: CPT

## 2023-04-17 PROCEDURE — 85025 COMPLETE CBC W/AUTO DIFF WBC: CPT

## 2023-04-17 PROCEDURE — 84484 ASSAY OF TROPONIN QUANT: CPT

## 2023-04-17 PROCEDURE — 85379 FIBRIN DEGRADATION QUANT: CPT | Performed by: EMERGENCY MEDICINE

## 2023-04-17 PROCEDURE — 99285 EMERGENCY DEPT VISIT HI MDM: CPT

## 2023-04-17 RX ORDER — ASPIRIN 81 MG/1
324 TABLET, CHEWABLE ORAL ONCE
Status: COMPLETED | OUTPATIENT
Start: 2023-04-17 | End: 2023-04-17

## 2023-04-18 ENCOUNTER — TELEPHONE (OUTPATIENT)
Dept: CASE MANAGEMENT | Facility: HOSPITAL | Age: 53
End: 2023-04-18

## 2023-04-18 ENCOUNTER — HOSPITAL ENCOUNTER (OUTPATIENT)
Dept: CV DIAGNOSTICS | Age: 53
Discharge: HOME | End: 2023-04-18
Attending: EMERGENCY MEDICINE
Payer: COMMERCIAL

## 2023-04-18 DIAGNOSIS — R07.89 CHEST PAIN, ATYPICAL: ICD-10-CM

## 2023-04-18 LAB
ATRIAL RATE: 91 BPM
P AXIS: 51 DEGREES
P-R INTERVAL: 142 MS
Q-T INTERVAL: 346 MS
QRS DURATION: 88 MS
QTC CALCULATION (BEZET): 425 MS
R AXIS: 35 DEGREES
T AXIS: 22 DEGREES
VENTRICULAR RATE: 91 BPM

## 2023-04-18 PROCEDURE — 93350 STRESS TTE ONLY: CPT | Performed by: EMERGENCY MEDICINE

## 2023-04-18 PROCEDURE — 93017 CV STRESS TEST TRACING ONLY: CPT | Performed by: EMERGENCY MEDICINE

## 2023-04-18 PROCEDURE — 93018 CV STRESS TEST I&R ONLY: CPT | Performed by: EMERGENCY MEDICINE

## 2023-04-18 NOTE — CM/SW NOTE
Received a call from [facility] requesting assistance on scheduling an outpt stress echo for the pt. Was able to get 5/3/2023 @ 1345 for outpt stress echo. Will endorse to incoming [facility] to call [facility] to get earlier appt and will call pt if able to schedule an earlier one.

## 2023-04-18 NOTE — CM/SW NOTE
With the help of [facility], able to get a sooner appointment for stress test.  Patient is now scheduled for stress echo at [facility] today at [facility].  Patient verbalized understanding and is able to make today's appointment

## 2023-04-18 NOTE — CM/SW NOTE
LM for patient. Earlier stress echo appointment available. As early as Thursday, 4/20/23 @ [facility]. Await call back. Patient is currently scheduled for 5/3/23.

## 2024-01-30 ENCOUNTER — HOSPITAL ENCOUNTER (EMERGENCY)
Age: 54
Discharge: HOME | End: 2024-01-30
Attending: EMERGENCY MEDICINE
Payer: COMMERCIAL

## 2024-01-30 ENCOUNTER — APPOINTMENT (OUTPATIENT)
Dept: CT IMAGING | Age: 54
End: 2024-01-30
Attending: NURSE PRACTITIONER
Payer: COMMERCIAL

## 2024-01-30 VITALS
WEIGHT: 220 LBS | HEIGHT: 70 IN | OXYGEN SATURATION: 99 % | SYSTOLIC BLOOD PRESSURE: 148 MMHG | HEART RATE: 74 BPM | RESPIRATION RATE: 16 BRPM | DIASTOLIC BLOOD PRESSURE: 98 MMHG | BODY MASS INDEX: 31.5 KG/M2 | TEMPERATURE: 98 F

## 2024-01-30 DIAGNOSIS — N20.0 NEPHROLITHIASIS: Primary | ICD-10-CM

## 2024-01-30 DIAGNOSIS — N23 RENAL COLIC: ICD-10-CM

## 2024-01-30 LAB
ALBUMIN SERPL-MCNC: 4 G/DL (ref 3.4–5)
ALBUMIN/GLOB SERPL: 1.1 {RATIO} (ref 1–2)
ALP LIVER SERPL-CCNC: 82 U/L
ANION GAP SERPL CALC-SCNC: 5 MMOL/L (ref 0–18)
AST SERPL-CCNC: 14 U/L (ref 15–37)
BASOPHILS # BLD AUTO: 0.06 X10(3) UL (ref 0–0.2)
BASOPHILS NFR BLD AUTO: 0.7 %
BILIRUB SERPL-MCNC: 0.6 MG/DL (ref 0.1–2)
BILIRUB UR QL STRIP.AUTO: NEGATIVE
BUN BLD-MCNC: 11 MG/DL (ref 9–23)
CALCIUM BLD-MCNC: 8.9 MG/DL (ref 8.5–10.1)
CHLORIDE SERPL-SCNC: 104 MMOL/L (ref 98–112)
CO2 SERPL-SCNC: 27 MMOL/L (ref 21–32)
COLOR UR AUTO: YELLOW
CREAT BLD-MCNC: 1.34 MG/DL
EGFRCR SERPLBLD CKD-EPI 2021: 63 ML/MIN/1.73M2 (ref 60–?)
EOSINOPHIL # BLD AUTO: 0.13 X10(3) UL (ref 0–0.7)
EOSINOPHIL NFR BLD AUTO: 1.5 %
ERYTHROCYTE [DISTWIDTH] IN BLOOD BY AUTOMATED COUNT: 12.1 %
GLOBULIN PLAS-MCNC: 3.5 G/DL (ref 2.8–4.4)
GLUCOSE BLD-MCNC: 133 MG/DL (ref 70–99)
GLUCOSE UR STRIP.AUTO-MCNC: NEGATIVE MG/DL
HCT VFR BLD AUTO: 40.9 %
HGB BLD-MCNC: 13.7 G/DL
IMM GRANULOCYTES # BLD AUTO: 0.03 X10(3) UL (ref 0–1)
IMM GRANULOCYTES NFR BLD: 0.3 %
KETONES UR STRIP.AUTO-MCNC: NEGATIVE MG/DL
LEUKOCYTE ESTERASE UR QL STRIP.AUTO: NEGATIVE
LYMPHOCYTES # BLD AUTO: 2.18 X10(3) UL (ref 1–4)
LYMPHOCYTES NFR BLD AUTO: 25.1 %
MCH RBC QN AUTO: 29.9 PG (ref 26–34)
MCHC RBC AUTO-ENTMCNC: 33.5 G/DL (ref 31–37)
MCV RBC AUTO: 89.3 FL
MONOCYTES # BLD AUTO: 0.83 X10(3) UL (ref 0.1–1)
MONOCYTES NFR BLD AUTO: 9.6 %
NEUTROPHILS # BLD AUTO: 5.46 X10 (3) UL (ref 1.5–7.7)
NEUTROPHILS # BLD AUTO: 5.46 X10(3) UL (ref 1.5–7.7)
NEUTROPHILS NFR BLD AUTO: 62.8 %
NITRITE UR QL STRIP.AUTO: NEGATIVE
OSMOLALITY SERPL CALC.SUM OF ELEC: 283 MOSM/KG (ref 275–295)
PH UR STRIP.AUTO: 6 [PH] (ref 5–8)
PLATELET # BLD AUTO: 331 10(3)UL (ref 150–450)
POTASSIUM SERPL-SCNC: 3.6 MMOL/L (ref 3.5–5.1)
PROT SERPL-MCNC: 7.5 G/DL (ref 6.4–8.2)
RBC # BLD AUTO: 4.58 X10(6)UL
RBC #/AREA URNS AUTO: >10 /HPF
SODIUM SERPL-SCNC: 136 MMOL/L (ref 136–145)
SP GR UR STRIP.AUTO: >=1.03 (ref 1–1.03)
UROBILINOGEN UR STRIP.AUTO-MCNC: 0.2 MG/DL
WBC # BLD AUTO: 8.7 X10(3) UL (ref 4–11)

## 2024-01-30 PROCEDURE — 85025 COMPLETE CBC W/AUTO DIFF WBC: CPT | Performed by: NURSE PRACTITIONER

## 2024-01-30 PROCEDURE — 99284 EMERGENCY DEPT VISIT MOD MDM: CPT

## 2024-01-30 PROCEDURE — 74176 CT ABD & PELVIS W/O CONTRAST: CPT | Performed by: NURSE PRACTITIONER

## 2024-01-30 PROCEDURE — 96361 HYDRATE IV INFUSION ADD-ON: CPT

## 2024-01-30 PROCEDURE — 81001 URINALYSIS AUTO W/SCOPE: CPT | Performed by: EMERGENCY MEDICINE

## 2024-01-30 PROCEDURE — 80053 COMPREHEN METABOLIC PANEL: CPT | Performed by: NURSE PRACTITIONER

## 2024-01-30 PROCEDURE — 99285 EMERGENCY DEPT VISIT HI MDM: CPT

## 2024-01-30 PROCEDURE — 96374 THER/PROPH/DIAG INJ IV PUSH: CPT

## 2024-01-30 PROCEDURE — 81015 MICROSCOPIC EXAM OF URINE: CPT | Performed by: EMERGENCY MEDICINE

## 2024-01-30 PROCEDURE — 96375 TX/PRO/DX INJ NEW DRUG ADDON: CPT

## 2024-01-30 RX ORDER — HYDROMORPHONE HYDROCHLORIDE 1 MG/ML
0.5 INJECTION, SOLUTION INTRAMUSCULAR; INTRAVENOUS; SUBCUTANEOUS EVERY 30 MIN PRN
Status: DISCONTINUED | OUTPATIENT
Start: 2024-01-30 | End: 2024-01-30

## 2024-01-30 RX ORDER — KETOROLAC TROMETHAMINE 30 MG/ML
30 INJECTION, SOLUTION INTRAMUSCULAR; INTRAVENOUS ONCE
Status: COMPLETED | OUTPATIENT
Start: 2024-01-30 | End: 2024-01-30

## 2024-01-30 RX ORDER — HYDROCODONE BITARTRATE AND ACETAMINOPHEN 5; 325 MG/1; MG/1
1-2 TABLET ORAL EVERY 6 HOURS PRN
Qty: 10 TABLET | Refills: 0 | Status: SHIPPED | OUTPATIENT
Start: 2024-01-30 | End: 2024-02-04

## 2024-01-30 RX ORDER — ONDANSETRON 2 MG/ML
4 INJECTION INTRAMUSCULAR; INTRAVENOUS
Status: DISCONTINUED | OUTPATIENT
Start: 2024-01-30 | End: 2024-01-30

## 2024-01-30 RX ORDER — TAMSULOSIN HYDROCHLORIDE 0.4 MG/1
0.4 CAPSULE ORAL DAILY
Qty: 7 CAPSULE | Refills: 0 | Status: SHIPPED | OUTPATIENT
Start: 2024-01-30 | End: 2024-02-06

## 2024-01-30 RX ORDER — ONDANSETRON 8 MG/1
8 TABLET, ORALLY DISINTEGRATING ORAL EVERY 6 HOURS PRN
Qty: 10 TABLET | Refills: 0 | Status: SHIPPED | OUTPATIENT
Start: 2024-01-30

## 2024-01-30 NOTE — DISCHARGE INSTRUCTIONS
Push fluids  Rest  Strain urine. Save the stone for your doctor.  Motrin for pain  Norco for severe pain.  You may alternate between Motrin and Norco as well.   Zofran for nausea  Flomax may help the stone to pass    Follow up with your doctor or urologist within a few days

## 2024-01-30 NOTE — ED PROVIDER NOTES
Patient Seen in: [facility] In [place]      History     Chief Complaint   Patient presents with    Abdomen/Flank Pain     Stated Complaint: left kidney stones hx    Subjective:   HPI    Patient is a pleasant 53-year-old male with history of kidney stones and hypertension here for evaluation of left flank pain.  Symptoms started this morning around 530, pain awoke him from sleep.  Similar presentation about 3 weeks ago, this duration lasted about 30 minutes and resolved on its own.  Today, pain has been constant.  Similar sensation to past kidney stones.  Pain is rated 5 out of 10 in severity.  Patient did take ibuprofen this morning with minimal relief in pain.  Denies fevers, chills, urinary symptoms, abdominal pain, diarrhea, nausea or emesis episodes.      Objective:   Past Medical History:   Diagnosis Date    Calculus of kidney     Essential hypertension     PONV (postoperative nausea and vomiting)     nausea    Visual impairment     glasses/contacts              Past Surgical History:   Procedure Laterality Date    CATARACT      EYE SURGERY      LITHOTRIPSY      OTHER      wisdom teeth    VASECTOMY                  Social History     Socioeconomic History    Marital status:    Tobacco Use    Smoking status: Never    Smokeless tobacco: Never   Vaping Use    Vaping Use: Every day    Substances: Nicotine   Substance and Sexual Activity    Alcohol use: Yes     Comment: a couple per week    Drug use: Yes     Types: Cannabis     Comment: rare              Review of Systems    Positive for stated complaint: left kidney stones hx  Other systems are as noted in HPI.  Constitutional and vital signs reviewed.      All other systems reviewed and negative except as noted above.    Physical Exam     ED Triage Vitals [01/30/24 0922]   BP (!) 172/95   Pulse 81   Resp 16   Temp 97.9 °F (36.6 °C)   Temp src Oral   SpO2 100 %   O2 Device None (Room air)       Current:BP (!) 148/98   Pulse 74   Temp  97.9 °F (36.6 °C) (Oral)   Resp 16   Ht 177.8 cm (5' 10\")   Wt 99.8 kg   SpO2 99%   BMI 31.57 kg/m²         Physical Exam  Vitals and nursing note reviewed.   Constitutional:       General: He is not in acute distress.     Appearance: He is not ill-appearing, toxic-appearing or diaphoretic.   Eyes:      Extraocular Movements: Extraocular movements intact.      Pupils: Pupils are equal, round, and reactive to light.   Cardiovascular:      Rate and Rhythm: Normal rate and regular rhythm.      Heart sounds: Normal heart sounds.   Pulmonary:      Effort: Pulmonary effort is normal.      Breath sounds: Normal breath sounds.   Abdominal:      General: Bowel sounds are normal.      Palpations: Abdomen is soft.      Tenderness: There is no abdominal tenderness. There is left CVA tenderness.   Neurological:      Mental Status: He is alert.           ED Course     Labs Reviewed   URINALYSIS, ROUTINE - Abnormal; Notable for the following components:       Result Value    Clarity Urine Hazy (*)     Blood Urine Large (*)     Protein Urine Trace (*)     All other components within normal limits   COMP METABOLIC PANEL (14) - Abnormal; Notable for the following components:    Glucose 133 (*)     Creatinine 1.34 (*)     AST 14 (*)     All other components within normal limits   UA MICROSCOPIC ONLY, URINE - Abnormal; Notable for the following components:    RBC Urine >10 (*)     Squamous Epi. Cells Few (*)     All other components within normal limits   CBC WITH DIFFERENTIAL WITH PLATELET    Narrative:     The following orders were created for panel order CBC With Differential With Platelet.  Procedure                               Abnormality         Status                     ---------                               -----------         ------                     CBC W/ DIFFERENTIAL[[id number]]                              Final result                 Please view results for these tests on the individual orders.   JANET MAO  LAVENDER   RAINBOW DRAW LIGHT GREEN   CBC W/ DIFFERENTIAL                CT ABDOMEN+PELVIS KIDNEYSTONE 2D RNDR(NO IV,NO ORAL)(CPT=74176)    Result Date: 1/30/2024  CONCLUSION:  Obstructing 4 mm stone within the left mid ureter with mild left hydronephrosis and [employer] nephric stranding.  Correlate with urinalysis to exclude urinary tract infection.    LOCATION:  [facility]   Dictated by (CST): Nicholas Joyner MD on 1/30/2024 at 10:56 AM     Finalized by (CST): Nicholas Joyner MD on 1/30/2024 at 10:58 AM           [facility]                    Medical Decision Making  Differentials include but are not limited to kidney stone, musculoskeletal etiology and pyelonephritis.  Urinalysis reveals large blood and trace protein.  CBC and CMP unremarkable.  CT reveals 4 mm stone and left ureter with mild hydronephrosis and perinephritic stranding.  Patient received 1 L normal saline, Toradol and Dilaudid here with subjective improvement in pain.  Will start Flomax.  Patient to utilize Norco and Zofran as needed.  Close outpatient follow-up with urology.  Strict ER precautions reviewed with patient who agrees with plan of care.  All questions answered to patient's satisfaction.    Patient presentation and plan of care reviewed and formulated with Dr. Alex, emergency department physician    Amount and/or Complexity of Data Reviewed  Labs: ordered. Decision-making details documented in ED Course.  Radiology: ordered. Decision-making details documented in ED Course.        Disposition and Plan     Clinical Impression:  1. Nephrolithiasis    2. Renal colic         Disposition:  Discharge  1/30/2024 11:29 am    Follow-up:  Héctor Longoria MD  [facility] [address] [place] [address]  [phone]    Schedule an appointment as soon as possible for a visit            Medications Prescribed:  Discharge Medication List as of 1/30/2024 11:30 AM        START taking these medications    Details   tamsulosin (FLOMAX) 0.4 MG Oral Cap Take 1 capsule (0.4  mg total) by mouth daily for 7 days., Normal, Disp-7 capsule, R-0      HYDROcodone-acetaminophen 5-325 MG Oral Tab Take 1-2 tablets by mouth every 6 (six) hours as needed for Pain., Normal, Disp-10 tablet, R-0      ondansetron 8 MG Oral Tablet Dispersible Take 1 tablet (8 mg total) by mouth every 6 (six) hours as needed for Nausea., Normal, Disp-10 tablet, R-0

## 2024-01-30 NOTE — ED PROVIDER NOTES
Patient complaining of pain that woke him up at about 5:30 in the morning.  3 weeks ago, patient has similar pain that only lasts about 30 minutes.  It is reminiscent of what he has had with kidney stones 3 years ago.  Patient took ibuprofen without much relief.  Patient complains of nausea but no vomiting.  No burning with urination or blood in the urine.  No fever or chills.                  CT scan from 3 years ago  KIDNEYS:  Normal anatomic positions.  0.3 cm mid right ureteral calculus with mild hydronephrosis and perinephric stranding.  No additional calcifications in either kidney.  Left collecting system is normal caliber.         Patient symptoms suggest renal colic.  Pyelonephritis must be excluded but patient has no dysuria.  Musculoskeletal pain a consideration but story sounds more classic for renal colic, especially with his history.      Patient treated with IV fluid and offered pain and nausea medicine.    CBC shows normal white count, hemoglobin, and platelets  Metabolic panel shows normal electrolytes.  Creatinine mildly elevated 1.3  Urinalysis shows large blood with negative nitrites and leukocytes    CT abdomen pelvis  I personally reviewed the actual radiographs themselves and my individual interpretation shows 4mm stone left mid ureter  radiologist's formal interpretation which I have reviewed  CONCLUSION:  Obstructing 4 mm stone within the left mid ureter with mild left hydronephrosis and Digna nephric stranding.  Correlate with urinalysis to exclude urinary tract infection.     At this point, I believe patient may be safely discharged home.  I recommend:  Push fluids  Rest  Strain urine. Save the stone for your doctor.  Motrin for pain  Norco for severe pain.  You may alternate between Motrin and Norco as well.   Zofran for nausea  Flomax may help the stone to pass    Follow up with your doctor or urologist within a few days      I provided a substantive portion of care for this patient. I  personally performed the medical decision making for this encounter.

## (undated) DEVICE — SUPER TURBOVAC 90 IFS: Brand: COBLATION

## (undated) DEVICE — NITINOL STONE RETRIEVAL BASKET: Brand: ZERO TIP

## (undated) DEVICE — ADHESIVE MASTISOL 2/3CC VL

## (undated) DEVICE — STERILE POLYISOPRENE POWDER-FREE SURGICAL GLOVES WITH EMOLLIENT COATING: Brand: PROTEXIS

## (undated) DEVICE — DUAL SPIKE ADAPTER: Brand: CONMED

## (undated) DEVICE — SUTURE PDS II 1 ST-21

## (undated) DEVICE — SEAL Y BIOPSY PORT P6R SCOPE

## (undated) DEVICE — 3M™ STERI-DRAPE™ U-DRAPE 1015: Brand: STERI-DRAPE™

## (undated) DEVICE — WRAP COOLING SHLDR W/ICE PILLO

## (undated) DEVICE — KIT BIO TEND AR-1676DS

## (undated) DEVICE — ABDOMINAL PAD: Brand: DERMACEA

## (undated) DEVICE — DRAPE,U/SHT,SPLIT,FILM,60X84,STERILE: Brand: MEDLINE

## (undated) DEVICE — MEDI-VAC SUCTION HANDLE REGULAR CAPACITY: Brand: CARDINAL HEALTH

## (undated) DEVICE — SHEET,DRAPE,70X100,STERILE: Brand: MEDLINE

## (undated) DEVICE — GOWN SURG AERO CHROME XXL

## (undated) DEVICE — Device

## (undated) DEVICE — 3M™ IOBAN™ 2 ANTIMICROBIAL INCISE DRAPE 6648EZ: Brand: IOBAN™ 2

## (undated) DEVICE — TIGERTAIL 5F FLXTIP 70CM

## (undated) DEVICE — SCD SLEEVE KNEE HI BLEND

## (undated) DEVICE — [STANDARD 12-FLUTE BARREL BUR, ARTHROSCOPIC SHAVER BLADE,  DO NOT RESTERILIZE,  DO NOT USE IF PACKAGE IS DAMAGED,  KEEP DRY,  KEEP AWAY FROM SUNLIGHT]: Brand: FORMULA

## (undated) DEVICE — 3M™ STERI-STRIP™ REINFORCED ADHESIVE SKIN CLOSURES, R1547, 1/2 IN X 4 IN (12 MM X 100 MM), 6 STRIPS/ENVELOPE: Brand: 3M™ STERI-STRIP™

## (undated) DEVICE — HANDLE LIGHT ECONOMY

## (undated) DEVICE — SYRINGE 20CC LL TIP

## (undated) DEVICE — MEDI-VAC NON-CONDUCTIVE SUCTION TUBING: Brand: CARDINAL HEALTH

## (undated) DEVICE — CANNULA 7MM TWIST AR-6570

## (undated) DEVICE — CYSTO CDS-LF: Brand: MEDLINE INDUSTRIES, INC.

## (undated) DEVICE — STERILE POLYISOPRENE POWDER-FREE SURGICAL GLOVES: Brand: PROTEXIS

## (undated) DEVICE — SHOULDER TRACTION KIT AR-1635

## (undated) DEVICE — SOL  .9 3000ML

## (undated) DEVICE — STERILE SYNTHETIC NEOPRENE POWDER-FREE SURGICAL GLOVES WITH NITRILE COATING, SMOOTH FINISH, STRAIGHT FINGER: Brand: PROTEXIS

## (undated) DEVICE — #15 STERILE STAINLESS BLADE: Brand: STERILE STAINLESS BLADES

## (undated) DEVICE — NON-ADHERENT STRIPS,OIL EMULSION: Brand: CURITY

## (undated) DEVICE — NEEDLE SCORPION AR-13995N

## (undated) DEVICE — SOL LACT RINGERS 3000ML

## (undated) DEVICE — SHOULDER ARTHROSCOPY CDS-LF: Brand: MEDLINE INDUSTRIES, INC.

## (undated) DEVICE — ZIPWIRE GUIDEWIRE .035X150 STR

## (undated) DEVICE — SUPER SPONGES,MEDIUM: Brand: KERLIX

## (undated) DEVICE — CAUTERY PENCIL

## (undated) DEVICE — SUTURE ETHILON 2-0 FS

## (undated) DEVICE — [AGGRESSIVE PLUS CUTTER, ARTHROSCOPIC SHAVER BLADE,  DO NOT RESTERILIZE,  DO NOT USE IF PACKAGE IS DAMAGED,  KEEP DRY,  KEEP AWAY FROM SUNLIGHT]: Brand: FORMULA

## (undated) DEVICE — CHLORAPREP 26ML APPLICATOR

## (undated) DEVICE — UNDYED BRAIDED (POLYGLACTIN 910), SYNTHETIC ABSORBABLE SUTURE: Brand: COATED VICRYL

## (undated) DEVICE — KENDALL SCD EXPRESS SLEEVES, KNEE LENGTH, MEDIUM: Brand: KENDALL SCD

## (undated) DEVICE — TUBING IRR 16FT CNT WV 3 ASCP

## (undated) DEVICE — DUAL LUMEN URETERAL CATHETER

## (undated) DEVICE — EXOFIN TISSUE ADHESIVE 1.0ML

## (undated) NOTE — LETTER
Date: 9/30/2021    Patient Name: Nasima Montaño          To Whom it may concern:     This letter has been written at the patient's request. The above patient was seen at one of the [facility] locations for treatment of a medical conditi

## (undated) NOTE — LETTER
Date: 4/14/2021    Patient Name: Aidan Other          To Whom it may concern:     This letter has been written at the patient's request. The above patient was seen at one of the [facility] locations for treatment of a medical conditi

## (undated) NOTE — LETTER
Patient Name: Alicja Suarez  YOB: 1970          MRN :  [id number]  Date:  8/31/2021  Referring Physician:  Asmita Guzman    Progress Summary  Pt has attended 7 visits in Physical Therapy.    Subjective:   No pain  Objective:   Left should

## (undated) NOTE — LETTER
Date: 3/3/2021    Patient Name: Radha Alva          To Whom it may concern:     This letter has been written at the patient's request. The above patient was seen at one of the [facility] locations for treatment of a medical conditio

## (undated) NOTE — LETTER
Date: 9/2/2021    Patient Name: Renata Callejas          To Whom it may concern:     This letter has been written at the patient's request. The above patient was seen at one of the [facility] locations for treatment of a medical conditio

## (undated) NOTE — LETTER
Date: 6/2/2021    Patient Name: Saulo Cardoso          To Whom it may concern:     This letter has been written at the patient's request. The above patient was seen at one of the [facility] locations for treatment of a medical conditio

## (undated) NOTE — LETTER
Date: 10/28/2021    Patient Name: Nasima Montaño          To Whom it may concern:     This letter has been written at the patient's request. The above patient was seen at one of the [address] for treatment of a medical condit

## (undated) NOTE — LETTER
Patient Name: Ara Zuleta  YOB: 1970          MRN :  [id number]  Date:  9/29/2021  Referring Physician:  Mercedes Cox    Progress Summary  Pt has attended 12 visits in Physical Therapy.    Subjective:   Patient states that his shoulder under my care.     X___________________________________________________ Date____________________    Certification From: 8/48/7188  To:12/28/2021

## (undated) NOTE — LETTER
Patient Name: Jahaira Colorado  YOB: 1970          MRN :  [id number]  Date:  10/27/2021  Referring Physician:  Fariba Mercer    Progress Summary  Pt has attended 16 visits in Physical Therapy. Subjective:   No pain.  Patient states he has

## (undated) NOTE — LETTER
Date: 6/23/2021    Patient Name: Radha Alva          To Whom it may concern:     This letter has been written at the patient's request. The above patient was seen at one of the [facility] locations for treatment of a medical conditi

## (undated) NOTE — LETTER
Date: 8/5/2021    Patient Name: Lelo Broussard          To Whom it may concern:     This letter has been written at the patient's request. The above patient was seen at one of the [facility] locations for treatment of a medical conditio

## (undated) NOTE — LETTER
03/24/21    To Whom it may concern: This letter has been written at the patient's request. The above patient was seen at one of the [facility] locations for treatment of a medical condition.     The patient may return to work for 3 weeks

## (undated) NOTE — LETTER
Date: 3/24/2021    Patient Name: Malathi Rivero          To Whom it may concern:     This letter has been written at the patient's request. The above patient was seen at one of the [facility] locations for treatment of a medical conditi

## (undated) NOTE — LETTER
Date: 12/1/2021    Patient Name: Guanakito Abel          To Whom it may concern:     This letter has been written at the patient's request. The above patient was seen at one of the [address] for treatment of a medical conditi

## (undated) NOTE — LETTER
Date: 1/5/2022    Patient Name: Hardik Richey          To Whom it may concern:     This letter has been written at the patient's request. The above patient was seen at one of the [facility] locations for treatment of a medical conditio

## (undated) NOTE — LETTER
06/23/21  H&P REQUEST        [place]  11/11/1970    Surgeon:Dr. Penelope Kumar    Dx: Left shoulder partial rotator cuff tear, left shoulder impingement, partial biceps subluxation/tear     Surgical Procedure:Left shoulder arthroscopy with debride

## (undated) NOTE — LETTER
Date: 5/12/2021    Patient Name: Nancy Acosta          To Whom it may concern:     This letter has been written at the patient's request. The above patient was seen at one of the [facility] locations for treatment of a medical conditi

## (undated) NOTE — LETTER
06/23/21  Post-operative Arthroscopy    Medication: Before you leave the hospital, you will be given a prescription for a pain reliever. This medication contains a narcotic with acetaminophen (Tylenol), so do not take any additional Tylenol.     You may mitchell out. Your plan for physical therapy, driving, and returning to work will be discussed at this appointment. Please don't hesitate to contact our office at [phone] if you have any post-operative questions or concerns.